# Patient Record
Sex: FEMALE | Race: BLACK OR AFRICAN AMERICAN | Employment: OTHER | URBAN - METROPOLITAN AREA
[De-identification: names, ages, dates, MRNs, and addresses within clinical notes are randomized per-mention and may not be internally consistent; named-entity substitution may affect disease eponyms.]

---

## 2017-12-05 ENCOUNTER — OFFICE VISIT (OUTPATIENT)
Dept: CARDIOLOGY CLINIC | Age: 72
End: 2017-12-05

## 2017-12-05 VITALS
HEIGHT: 68 IN | DIASTOLIC BLOOD PRESSURE: 68 MMHG | BODY MASS INDEX: 42.74 KG/M2 | SYSTOLIC BLOOD PRESSURE: 147 MMHG | WEIGHT: 282 LBS | HEART RATE: 61 BPM

## 2017-12-05 DIAGNOSIS — I50.32 CHRONIC DIASTOLIC HEART FAILURE (HCC): Primary | ICD-10-CM

## 2017-12-05 DIAGNOSIS — R07.9 CHEST PAIN, UNSPECIFIED TYPE: ICD-10-CM

## 2017-12-05 DIAGNOSIS — I10 ESSENTIAL HYPERTENSION: ICD-10-CM

## 2017-12-05 DIAGNOSIS — E66.01 MORBID OBESITY (HCC): ICD-10-CM

## 2017-12-05 RX ORDER — METOPROLOL SUCCINATE 25 MG/1
25 TABLET, EXTENDED RELEASE ORAL DAILY
Qty: 30 TAB | Refills: 6 | Status: SHIPPED | OUTPATIENT
Start: 2017-12-05 | End: 2018-07-05

## 2017-12-05 NOTE — PROGRESS NOTES
HISTORY OF PRESENT ILLNESS  Otilia Holland is a 67 y.o. female. CHF   The history is provided by the patient. This is a chronic problem. The problem occurs constantly. The problem has not changed since onset. Pertinent negatives include no chest pain, no abdominal pain, no headaches and no shortness of breath. The symptoms are aggravated by exertion. The symptoms are relieved by rest.   Chest Pain (Angina)    The history is provided by the patient. This is a new problem. The current episode started more than 1 week ago. The problem has not changed since onset. The problem occurs every several days. The pain is associated with rest. The pain is present in the substernal region. The pain is mild. The quality of the pain is described as pressure-like. The pain does not radiate. Pertinent negatives include no abdominal pain, no claudication, no cough, no dizziness, no fever, no headaches, no hemoptysis, no nausea, no orthopnea, no palpitations, no PND, no shortness of breath, no sputum production, no vomiting and no weakness. Review of Systems   Constitutional: Negative for chills and fever. HENT: Negative for nosebleeds. Eyes: Negative for blurred vision and double vision. Respiratory: Negative for cough, hemoptysis, sputum production, shortness of breath and wheezing. Cardiovascular: Negative for chest pain, palpitations, orthopnea, claudication, leg swelling and PND. Gastrointestinal: Negative for abdominal pain, heartburn, nausea and vomiting. Musculoskeletal: Negative for myalgias. Skin: Negative for rash. Neurological: Negative for dizziness, weakness and headaches. Endo/Heme/Allergies: Does not bruise/bleed easily.      Family History   Problem Relation Age of Onset    Other Mother      congestive heart failure    Hypertension Mother     Stroke Mother     Hypertension Father     Heart Attack Father     Hypertension Brother        Past Medical History:   Diagnosis Date    Chronic diastolic CHF (congestive heart failure) (HCC)     Chronic diastolic heart failure (HCC)     Hx of colonic polyp     Hypothyroidism     Morbid obesity ([facility] [address] 7/9/2013    Transfusion history     No history of receiving blood or blood product transfusion(s).  Unspecified hypothyroidism        Past Surgical History:   Procedure Laterality Date    HX BUNIONECTOMY      left big toe    HX COLONOSCOPY  8/26/11    HX DILATION AND CURETTAGE  2011       Social History   Substance Use Topics    Smoking status: Never Smoker    Smokeless tobacco: Never Used    Alcohol use No      Comment: wine ocassionally lil wine       Allergies   Allergen Reactions    Ciprofloxacin-Dexamethasone Nausea Only       Prior to Admission medications    Medication Sig Start Date End Date Taking? Authorizing Provider   metoprolol succinate (TOPROL-XL) 25 mg XL tablet Take 1 Tab by mouth daily. 12/5/17  Yes Eugene Milligan MD   NAPROXEN SOD/DIPHENHYDRAM HCL (ALEVE PM PO) Take  by mouth as needed. Yes Historical Provider   hydrocortisone-pramoxine [facility]) 2.5-1 % rectal cream Apply  to affected area two (2) times a day. Patient taking differently: Apply  to affected area as needed. 12/30/13  Yes Delton Lesch, MD   levothyroxine (SYNTHROID) 100 mcg tablet Take  by mouth Daily (before breakfast). Yes Historical Provider         Visit Vitals    /68    Pulse 61    Ht 5' 8\" (1.727 m)    Wt 127.9 kg (282 lb)    BMI 42.88 kg/m2         Physical Exam   Constitutional: She is oriented to person, place, and time. She appears well-developed and well-nourished. HENT:   Head: Normocephalic and atraumatic. Eyes: Conjunctivae are normal.   Neck: Neck supple. No JVD present. No tracheal deviation present. No thyromegaly present. Cardiovascular: Normal rate and regular rhythm. PMI is not displaced. Exam reveals no gallop, no S3 and no decreased pulses. No murmur heard. Pulmonary/Chest: No respiratory distress.  She has no wheezes. She has no rales. She exhibits no tenderness. Abdominal: Soft. There is no tenderness. Musculoskeletal: She exhibits no edema. Neurological: She is alert and oriented to person, place, and time. Skin: Skin is warm. Psychiatric: She has a normal mood and affect. Ms. Talya Horn has a reminder for a \"due or due soon\" health maintenance. I have asked that she contact her primary care provider for follow-up on this health maintenance. CARDIOLOGY STUDIES 5/1/2012 1/2/2009   EKG Result WNL -   Echocardiogram - Complete Result - 60% EF, mild DD   Some recent data might be hidden         Assessment         ICD-10-CM ICD-9-CM    1. Chronic diastolic heart failure (HCC) I50.32 428.32 2D ECHO COMPLETE ADULT (TTE)      SCHEDULE NUCLEAR STUDY    stable  occ edema   2. Chest pain, unspecified type R07.9 786.50 2D ECHO COMPLETE ADULT (TTE)      SCHEDULE NUCLEAR STUDY    at rest  ? etiology  angina,chest wall   3. Morbid obesity (HCC) E66.01 278.01     disucssed  deit   4. Essential hypertension I10 401.9     mild add meds       There are no discontinued medications. Orders Placed This Encounter    SCHEDULE NUCLEAR STUDY     lexiscan stress test     Standing Status:   Future     Standing Expiration Date:   12/5/2018    2D ECHO COMPLETE ADULT (TTE)     Standing Status:   Future     Standing Expiration Date:   6/3/2018     Order Specific Question:   Reason for Exam:     Answer:   see diagnosis    metoprolol succinate (TOPROL-XL) 25 mg XL tablet     Sig: Take 1 Tab by mouth daily. Dispense:  30 Tab     Refill:  6       Follow-up Disposition:  Return for F/u after tests.

## 2017-12-05 NOTE — PROGRESS NOTES
1. Have you been to the ER, urgent care clinic since your last visit? Hospitalized since your last visit? No    2. Have you seen or consulted any other health care providers outside of the [address] [place] [address] since your last visit? Include any pap smears or colon screening. No     3. Since your last visit, have you had any of the following symptoms? .           4. Have you had any blood work, X-rays or cardiac testing? No             5.  Where do you normally have your labs drawn? [facility]    6. Do you need any refills today?    No

## 2017-12-05 NOTE — LETTER
Claudette Rhea 1945 
 
12/5/2017 Dear MD Kenia Wilkins MD 
 
I had the pleasure of evaluating  Ms. Alpesh Wing in office today. Below are the relevant portions of my assessment and plan of care. ICD-10-CM ICD-9-CM 1. Chronic diastolic heart failure (HCC) I50.32 428.32 2D ECHO COMPLETE ADULT (TTE) SCHEDULE NUCLEAR STUDY  
 stable 
occ edema 2. Chest pain, unspecified type R07.9 786.50 2D ECHO COMPLETE ADULT (TTE) SCHEDULE NUCLEAR STUDY  
 at rest 
? etiology 
angina,chest wall 3. Morbid obesity ([facility] [address] E66.01 278.01   
 disucssed  deit 4. Essential hypertension I10 401.9   
 mild add meds Current Outpatient Prescriptions Medication Sig Dispense Refill  metoprolol succinate (TOPROL-XL) 25 mg XL tablet Take 1 Tab by mouth daily. 30 Tab 6  
 NAPROXEN SOD/DIPHENHYDRAM HCL (ALEVE PM PO) Take  by mouth as needed.  hydrocortisone-pramoxine (ANALPRAM-HC) 2.5-1 % rectal cream Apply  to affected area two (2) times a day. (Patient taking differently: Apply  to affected area as needed.) 30 g 0  
 levothyroxine (SYNTHROID) 100 mcg tablet Take  by mouth Daily (before breakfast). Orders Placed This Encounter  SCHEDULE NUCLEAR STUDY  
  lexiscan stress test  
  Standing Status:   Future Standing Expiration Date:   12/5/2018  2D ECHO COMPLETE ADULT (TTE) Standing Status:   Future Standing Expiration Date:   6/3/2018 Order Specific Question:   Reason for Exam: Answer:   see diagnosis  metoprolol succinate (TOPROL-XL) 25 mg XL tablet Sig: Take 1 Tab by mouth daily. Dispense:  30 Tab Refill:  6 If you have questions, please do not hesitate to call me. I look forward to following Ms. Alpesh Wing along with you. Sincerely, Erik Leone MD

## 2017-12-05 NOTE — MR AVS SNAPSHOT
Visit Information Date & Time Provider Department Dept. Phone Encounter #  
 12/5/2017 11:30 AM Eduardo Zheng MD [facility] [place] [phone] [id number] Follow-up Instructions Return for F/u after tests. Upcoming Health Maintenance Date Due Hepatitis C Screening 1945 DTaP/Tdap/Td series (1 - Tdap) 5/3/1966 BREAST CANCER SCRN MAMMOGRAM 5/3/1995 ZOSTER VACCINE AGE 60> 3/3/2005 GLAUCOMA SCREENING Q2Y 5/3/2010 OSTEOPOROSIS SCREENING (DEXA) 5/3/2010 Pneumococcal 65+ Low/Medium Risk (1 of 2 - PCV13) 5/3/2010 MEDICARE YEARLY EXAM 5/3/2010 Influenza Age 5 to Adult 8/1/2017 COLONOSCOPY 8/26/2021 Allergies as of 12/5/2017  Review Complete On: 12/5/2017 By: Eduardo Zheng MD  
  
 Severity Noted Reaction Type Reactions Ciprofloxacin-dexamethasone Medium 10/25/2016    Nausea Only Current Immunizations  Never Reviewed No immunizations on file. Not reviewed this visit You Were Diagnosed With   
  
 Codes Comments Chronic diastolic heart failure (HCC)    -  Primary ICD-10-CM: I50.32 
ICD-9-CM: 428.32 stable 
occ edema Chest pain, unspecified type     ICD-10-CM: R07.9 ICD-9-CM: 786.50 at rest 
? etiology 
angina,chest wall [facility]     ICD-10-CM: E66.01 
ICD-9-CM: 278.01 disucssed  deit Essential hypertension     ICD-10-CM: I10 
ICD-9-CM: 401.9 mild add meds Vitals BP Pulse Height(growth percentile) Weight(growth percentile) BMI OB Status 147/68 61 5' 8\" (1.727 m) 282 lb (127.9 kg) 42.88 kg/m2 Postmenopausal  
 Smoking Status Never Smoker Vitals History BMI and BSA Data Body Mass Index Body Surface Area  
 42.88 kg/m 2 2.48 m 2 Preferred Pharmacy Pharmacy Name Phone [facility] [address], [address] Your Updated Medication List  
  
   
 This list is accurate as of: 12/5/17 11:35 AM.  Always use your most recent med list.  
  
  
  
  
 ALEVE PM PO Take  by mouth as needed. hydrocortisone-pramoxine 2.5-1 % rectal cream  
Commonly known as:  [facility] Apply  to affected area two (2) times a day. metoprolol succinate 25 mg XL tablet Commonly known as:  TOPROL-XL Take 1 Tab by mouth daily. SYNTHROID 100 mcg tablet Generic drug:  levothyroxine Take  by mouth Daily (before breakfast). Prescriptions Sent to Pharmacy Refills  
 metoprolol succinate (TOPROL-XL) 25 mg XL tablet 6 Sig: Take 1 Tab by mouth daily. Class: Normal  
 Pharmacy: [address] Ph #: [phone] Route: Oral  
  
Follow-up Instructions Return for F/u after tests. To-Do List   
 12/08/2017 Cardiac Services:  2D ECHO COMPLETE ADULT (TTE)   
  
 12/12/2017 Nursing:  SCHEDULE NUCLEAR STUDY Introducing [facility] & HEALTH SERVICES! Carmen Crouch introduces Education Everytime patient portal. Now you can access parts of your medical record, email your doctor's office, and request medication refills online. 1. In your internet browser, go to [email or web address] 2. Click on the First Time User? Click Here link in the Sign In box. You will see the New Member Sign Up page. 3. Enter your Education Everytime Access Code exactly as it appears below. You will not need to use this code after youve completed the sign-up process. If you do not sign up before the expiration date, you must request a new code. · Education Everytime Access Code: [email or web address] Expires: 3/5/2018 11:15 AM 
 
4. Enter the last four digits of your Social Security Number (xxxx) and Date of Birth (mm/dd/yyyy) as indicated and click Submit. You will be taken to the next sign-up page. 5. Create a Education Everytime ID. This will be your Education Everytime login ID and cannot be changed, so think of one that is secure and easy to remember. 6. Create a "[employer]" password. You can change your password at any time. 7. Enter your Password Reset Question and Answer. This can be used at a later time if you forget your password. 8. Enter your e-mail address. You will receive e-mail notification when new information is available in [address] 9. Click Sign Up. You can now view and download portions of your medical record. 10. Click the Download Summary menu link to download a portable copy of your medical information. If you have questions, please visit the Frequently Asked Questions section of the "[employer]" website. Remember, "[employer]" is NOT to be used for urgent needs. For medical emergencies, dial 911. Now available from your iPhone and Android! Please provide this summary of care documentation to your next provider. Your primary care clinician is listed as Duncan Leal. If you have any questions after today's visit, please call [phone].

## 2018-01-02 ENCOUNTER — CLINICAL SUPPORT (OUTPATIENT)
Dept: CARDIOLOGY CLINIC | Age: 73
End: 2018-01-02

## 2018-01-02 DIAGNOSIS — I10 ESSENTIAL HYPERTENSION, MALIGNANT: ICD-10-CM

## 2018-01-02 DIAGNOSIS — R07.9 CHEST PAIN, UNSPECIFIED TYPE: ICD-10-CM

## 2018-01-02 DIAGNOSIS — I50.32 CHRONIC DIASTOLIC HEART FAILURE (HCC): ICD-10-CM

## 2018-01-02 DIAGNOSIS — I50.32 CHRONIC DIASTOLIC HEART FAILURE (HCC): Primary | ICD-10-CM

## 2018-01-02 NOTE — PROGRESS NOTES
[facility]  [address], [facility] [employer], [address] [place] [address], [place], [address] [place] [address]  [phone] [employer]  [phone] [place]       Name: Janak Melgar         MRN#: [id number]        YOB: 1945   Gender: female Ht:5'8\" Wt:282 lbs       . Date of Rest/Stress Images: 1/2/2018   Referring Physician: Mathew Rodriguez MD  Ordering Physician: Cleveland Rao. Kylah Mitchell MD, [facility]  Technologist: Loren Livingston. GLADIS Corrigan., C.N.M.T  Diagnosis:  1. Chronic diastolic heart failure (HCC)    2. Chest pain, unspecified type    3. Essential hypertension, malignant          Rest/Stress Myoview SPECT Myocardial Perfusion Imaging with  Lexiscan Stress and gated SPECT Imaging      PROCEDURE:      Myocardial perfusion imaging was performed at rest approximately 30 mins following the intravenous injection,(Right hand ) of 11.8 mCi of Tc99m Myoview for evaluation of myocardial function and perfusion at rest.    Baseline Data:    Baseline EKG reveals PACs, poor R-wave progression. Baseline heart rate is 54. Baseline blood pressure is 138/84. Procedure: The patient was injected with 0.4 mg IV Lexiscan. The patient had no significant symptoms. Heart rate increased from baseline to a heart rate of 85. Blood pressure increased to 146/78. Electrocardiogram showed no significant ST-T changes during the procedure. No significant arrhythmias were noted. Diagnosis:   1. Negative EKG portion of Lexiscan stress test.    2. Nuclear imaging report to follow. Pharmacological:  Patient was injected with . 4 mg/mL with Lexiscan intravenously over a period 10 to 20 sec. After pharmacologic stress, the patient was injected intravenously with 38.8 mCi of Tc99m Myoview. Gating post stress tomographic imaging performed approximately 45 minutes post tracer injection.  The data was reconstructed in the short, horizontal long and vertical long axis views and tomographic slices were generated. NUCLEAR IMAGING:    Findings:   1. Stress images reveal normal Myoview distrubution in all the LV segments in short axis, vertical and horizontal long axis views. 2. Resting images have a normal uptake. 3. Gated images reveal normal wall motion and the ejection fraction is calculated to be 85%. Conclusion:   1. Normal perfusion scan. 2. Normal wall motion and ejection fraction. 3. No evidence of significant fixed or reversible defect suggesting ischemia or myocardial infarction noted from this nuclear study. 4. Low risk scan. Thank you for the referral.    E-signed and Interpreting Physician:    Gabriele Pickering.  Kendrick Yi MD, [facility]     Date of interpretation: 1/2/2018  Date of final report: 1/2/2018

## 2018-01-16 ENCOUNTER — OFFICE VISIT (OUTPATIENT)
Dept: CARDIOLOGY CLINIC | Age: 73
End: 2018-01-16

## 2018-01-16 VITALS
DIASTOLIC BLOOD PRESSURE: 74 MMHG | WEIGHT: 286 LBS | SYSTOLIC BLOOD PRESSURE: 142 MMHG | HEART RATE: 65 BPM | HEIGHT: 68 IN | BODY MASS INDEX: 43.35 KG/M2

## 2018-01-16 DIAGNOSIS — R07.9 CHEST PAIN, UNSPECIFIED TYPE: ICD-10-CM

## 2018-01-16 DIAGNOSIS — I50.32 CHRONIC DIASTOLIC HEART FAILURE (HCC): Primary | ICD-10-CM

## 2018-01-16 DIAGNOSIS — I34.0 NON-RHEUMATIC MITRAL REGURGITATION: ICD-10-CM

## 2018-01-16 NOTE — MR AVS SNAPSHOT
[facility] 
 
 
 [address] [facility] 
[phone] Patient: Nieves Boateng MRN: [id number] QPQ:[birth year] Visit Information Date & Time Provider Department Dept. Phone Encounter #  
 1/16/2018 10:00 AM Jerardo Herrera MD [facility] [place] [phone] [id number] Follow-up Instructions Return in about 6 months (around 7/16/2018). Upcoming Health Maintenance Date Due Hepatitis C Screening 1945 DTaP/Tdap/Td series (1 - Tdap) 5/3/1966 BREAST CANCER SCRN MAMMOGRAM 5/3/1995 ZOSTER VACCINE AGE 60> 3/3/2005 GLAUCOMA SCREENING Q2Y 5/3/2010 OSTEOPOROSIS SCREENING (DEXA) 5/3/2010 Pneumococcal 65+ Low/Medium Risk (1 of 2 - PCV13) 5/3/2010 MEDICARE YEARLY EXAM 5/3/2010 Influenza Age 5 to Adult 8/1/2017 COLONOSCOPY 8/26/2021 Allergies as of 1/16/2018  Review Complete On: 1/16/2018 By: Jerardo Herrera MD  
  
 Severity Noted Reaction Type Reactions Ciprofloxacin-dexamethasone Medium 10/25/2016    Nausea Only Current Immunizations  Never Reviewed No immunizations on file. Not reviewed this visit You Were Diagnosed With   
  
 Codes Comments Chronic diastolic heart failure (HCC)    -  Primary ICD-10-CM: I50.32 
ICD-9-CM: 428.32 stable 
normal lvef Non-rheumatic mitral regurgitation     ICD-10-CM: I34.0 ICD-9-CM: 424.0 better Chest pain, unspecified type     ICD-10-CM: R07.9 ICD-9-CM: 786.50 negative stress test  
  
Vitals BP Pulse Height(growth percentile) Weight(growth percentile) BMI OB Status 142/74 65 5' 8\" (1.727 m) 286 lb (129.7 kg) 43.49 kg/m2 Postmenopausal  
 Smoking Status Never Smoker Vitals History BMI and BSA Data Body Mass Index Body Surface Area  
 43.49 kg/m 2 2.49 m 2 Preferred Pharmacy Pharmacy Name Phone [address] [facility], [address] Your Updated Medication List  
  
   
This list is accurate as of: 1/16/18 10:22 AM.  Always use your most recent med list.  
  
  
  
  
 ALEVE PM PO Take  by mouth as needed. hydrocortisone-pramoxine 2.5-1 % rectal cream  
Commonly known as:  [facility] Apply  to affected area two (2) times a day. metoprolol succinate 25 mg XL tablet Commonly known as:  TOPROL-XL Take 1 Tab by mouth daily. SYNTHROID 100 mcg tablet Generic drug:  levothyroxine Take  by mouth Daily (before breakfast). Follow-up Instructions Return in about 6 months (around 7/16/2018). Introducing [facility] & HEALTH SERVICES! Angelica Brito introduces [employer] patient portal. Now you can access parts of your medical record, email your doctor's office, and request medication refills online. 1. In your internet browser, go to [email or web address] 2. Click on the First Time User? Click Here link in the Sign In box. You will see the New Member Sign Up page. 3. Enter your [employer] Access Code exactly as it appears below. You will not need to use this code after youve completed the sign-up process. If you do not sign up before the expiration date, you must request a new code. · [employer] Access Code: [email or web address] Expires: 3/5/2018 11:15 AM 
 
4. Enter the last four digits of your Social Security Number (xxxx) and Date of Birth (mm/dd/yyyy) as indicated and click Submit. You will be taken to the next sign-up page. 5. Create a [employer] ID. This will be your Payoneer login ID and cannot be changed, so think of one that is secure and easy to remember. 6. Create a [employer] password. You can change your password at any time. 7. Enter your Password Reset Question and Answer. This can be used at a later time if you forget your password. 8. Enter your e-mail address. You will receive e-mail notification when new information is available in [address] 9. Click Sign Up. You can now view and download portions of your medical record. 10. Click the Download Summary menu link to download a portable copy of your medical information. If you have questions, please visit the Frequently Asked Questions section of the [employer] website. Remember, [employer] is NOT to be used for urgent needs. For medical emergencies, dial 911. Now available from your iPhone and Android! Please provide this summary of care documentation to your next provider. Your primary care clinician is listed as Justin Chilel. If you have any questions after today's visit, please call [phone].

## 2018-01-16 NOTE — PROGRESS NOTES
HISTORY OF PRESENT ILLNESS  Otilia Coates is a 67 y.o. female. Hypertension   The history is provided by the patient. This is a chronic problem. The problem occurs constantly. The problem has not changed since onset. Pertinent negatives include no chest pain, no abdominal pain, no headaches and no shortness of breath. CHF   The history is provided by the patient. This is a chronic problem. The problem occurs constantly. The problem has not changed since onset. Pertinent negatives include no chest pain, no abdominal pain, no headaches and no shortness of breath. The symptoms are aggravated by exertion. The symptoms are relieved by rest.   Chest Pain (Angina)    The history is provided by the patient. This is a new problem. The current episode started more than 1 week ago. The problem has not changed since onset. The problem occurs every several days. The pain is associated with rest. The pain is present in the substernal region. The pain is mild. The quality of the pain is described as pressure-like. The pain does not radiate. Pertinent negatives include no abdominal pain, no claudication, no cough, no dizziness, no fever, no headaches, no hemoptysis, no nausea, no orthopnea, no palpitations, no PND, no shortness of breath, no sputum production, no vomiting and no weakness. Review of Systems   Constitutional: Negative for chills and fever. HENT: Negative for nosebleeds. Eyes: Negative for blurred vision and double vision. Respiratory: Negative for cough, hemoptysis, sputum production, shortness of breath and wheezing. Cardiovascular: Negative for chest pain, palpitations, orthopnea, claudication, leg swelling and PND. Gastrointestinal: Negative for abdominal pain, heartburn, nausea and vomiting. Musculoskeletal: Negative for myalgias. Skin: Negative for rash. Neurological: Negative for dizziness, weakness and headaches. Endo/Heme/Allergies: Does not bruise/bleed easily.      Family History Problem Relation Age of Onset    Other Mother      congestive heart failure    Hypertension Mother     Stroke Mother     Hypertension Father     Heart Attack Father     Hypertension Brother        Past Medical History:   Diagnosis Date    Chronic diastolic CHF (congestive heart failure) (HCC)     Chronic diastolic heart failure (HCC)     Hx of colonic polyp     Hypothyroidism     Morbid obesity ([facility] [address] 7/9/2013    Transfusion history     No history of receiving blood or blood product transfusion(s).  Unspecified hypothyroidism        Past Surgical History:   Procedure Laterality Date    HX BUNIONECTOMY      left big toe    HX COLONOSCOPY  8/26/11    HX DILATION AND CURETTAGE  2011       Social History   Substance Use Topics    Smoking status: Never Smoker    Smokeless tobacco: Never Used    Alcohol use No      Comment: wine ocassionally lil wine       Allergies   Allergen Reactions    Ciprofloxacin-Dexamethasone Nausea Only       Prior to Admission medications    Medication Sig Start Date End Date Taking? Authorizing Provider   NAPROXEN SOD/DIPHENHYDRAM HCL (ALEVE PM PO) Take  by mouth as needed. Yes Historical Provider   hydrocortisone-pramoxine [facility]) 2.5-1 % rectal cream Apply  to affected area two (2) times a day. Patient taking differently: Apply  to affected area as needed. 12/30/13  Yes Fer Zepeda MD   levothyroxine (SYNTHROID) 100 mcg tablet Take  by mouth Daily (before breakfast). Yes Historical Provider   metoprolol succinate (TOPROL-XL) 25 mg XL tablet Take 1 Tab by mouth daily. 12/5/17   Mary Farley MD         Visit Vitals    /74    Pulse 65    Ht 5' 8\" (1.727 m)    Wt 129.7 kg (286 lb)    BMI 43.49 kg/m2         Physical Exam   Constitutional: She is oriented to person, place, and time. She appears well-developed and well-nourished. HENT:   Head: Normocephalic and atraumatic. Eyes: Conjunctivae are normal.   Neck: Neck supple. No JVD present.  No tracheal deviation present. No thyromegaly present. Cardiovascular: Normal rate and regular rhythm. PMI is not displaced. Exam reveals no gallop, no S3 and no decreased pulses. No murmur heard. Pulmonary/Chest: No respiratory distress. She has no wheezes. She has no rales. She exhibits no tenderness. Abdominal: Soft. There is no tenderness. Musculoskeletal: She exhibits no edema. Neurological: She is alert and oriented to person, place, and time. Skin: Skin is warm. Psychiatric: She has a normal mood and affect. Ms. Rin Hobbs has a reminder for a \"due or due soon\" health maintenance. I have asked that she contact her primary care provider for follow-up on this health maintenance. CARDIOLOGY STUDIES 2012   EKG Result WNL -   Echocardiogram - Complete Result - 60% EF, mild DD   Some recent data might be hidden     SUMMARY:echo-2018  Left ventricle: Systolic function was normal. Ejection fraction was  estimated in the range of 55 % to 60 %. There were no regional wall motion  abnormalities. There was mild concentric hypertrophy. Doppler parameters  were consistent with abnormal left ventricular relaxation (grade 1  diastolic dysfunction). Right ventricle: The size was at the upper limits of normal.    Mitral valve: There was mild annular calcification. Tricuspid valve: There was mild regurgitation. Pulmonic valve: There was mild regurgitation. NUCLEAR IMAGIN2018     Findings:   1. Stress images reveal normal Myoview distrubution in all the LV segments in short axis, vertical and horizontal long axis views. 2. Resting images have a normal uptake. 3. Gated images reveal normal wall motion and the ejection fraction is calculated to be 85%. Conclusion:   1. Normal perfusion scan. 2. Normal wall motion and ejection fraction. 3. No evidence of significant fixed or reversible defect suggesting ischemia or myocardial infarction noted from this nuclear study. 4.  Low risk scan. Assessment         ICD-10-CM ICD-9-CM    1. Chronic diastolic heart failure (HCC) I50.32 428.32     stable  normal lvef   2. Non-rheumatic mitral regurgitation I34.0 424.0     better   3. Chest pain, unspecified type R07.9 786.50     negative stress test       There are no discontinued medications. No orders of the defined types were placed in this encounter. Follow-up Disposition:  Return in about 6 months (around 7/16/2018).

## 2018-01-16 NOTE — PROGRESS NOTES
1. Have you been to the ER, urgent care clinic since your last visit? Hospitalized since your last visit? No    2. Have you seen or consulted any other health care providers outside of the [address] [place] [address] since your last visit? Include any pap smears or colon screening. No     3. Since your last visit, have you had any of the following symptoms? .          4. Have you had any blood work, X-rays or cardiac testing? No          5. Where do you normally have your labs drawn? [facility]/PCP    6. Do you need any refills today?    No

## 2018-01-16 NOTE — LETTER
Joshua Wilfrid 1945 
 
1/16/2018 Dear MD Tr Moyer MD 
 
I had the pleasure of evaluating  Ms. Rudolph Buckner in office today. Below are the relevant portions of my assessment and plan of care. ICD-10-CM ICD-9-CM 1. Chronic diastolic heart failure (HCC) I50.32 428.32   
 stable 
normal lvef 2. Non-rheumatic mitral regurgitation I34.0 424.0   
 better 3. Chest pain, unspecified type R07.9 786.50   
 negative stress test  
 
 
Current Outpatient Prescriptions Medication Sig Dispense Refill  NAPROXEN SOD/DIPHENHYDRAM HCL (ALEVE PM PO) Take  by mouth as needed.  hydrocortisone-pramoxine (ANALPRAM-HC) 2.5-1 % rectal cream Apply  to affected area two (2) times a day. (Patient taking differently: Apply  to affected area as needed.) 30 g 0  
 levothyroxine (SYNTHROID) 100 mcg tablet Take  by mouth Daily (before breakfast).  metoprolol succinate (TOPROL-XL) 25 mg XL tablet Take 1 Tab by mouth daily. 30 Tab 6 No orders of the defined types were placed in this encounter. If you have questions, please do not hesitate to call me. I look forward to following Ms. Rudolph Buckner along with you. Sincerely, Dao Shanks MD

## 2018-07-05 ENCOUNTER — OFFICE VISIT (OUTPATIENT)
Dept: CARDIOLOGY CLINIC | Age: 73
End: 2018-07-05

## 2018-07-05 VITALS
BODY MASS INDEX: 42.89 KG/M2 | HEART RATE: 61 BPM | DIASTOLIC BLOOD PRESSURE: 74 MMHG | WEIGHT: 283 LBS | HEIGHT: 68 IN | SYSTOLIC BLOOD PRESSURE: 133 MMHG

## 2018-07-05 DIAGNOSIS — E03.9 ACQUIRED HYPOTHYROIDISM: ICD-10-CM

## 2018-07-05 DIAGNOSIS — I50.32 CHRONIC DIASTOLIC HEART FAILURE (HCC): Primary | ICD-10-CM

## 2018-07-05 DIAGNOSIS — E66.01 MORBID OBESITY (HCC): ICD-10-CM

## 2018-07-05 NOTE — PROGRESS NOTES
1. Have you been to the ER, urgent care clinic since your last visit? Hospitalized since your last visit? No    2. Have you seen or consulted any other health care providers outside of the [address] [place] [address] since your last visit? Include any pap smears or colon screening. No     3. Since your last visit, have you had any of the following symptoms? .           4. Have you had any blood work, X-rays or cardiac testing? 5. Where do you normally have your labs drawn? 6. Do you need any refills today?

## 2018-07-05 NOTE — MR AVS SNAPSHOT
[address] [facility] 
 
 
 [address] [facility] 
[phone] Patient: Sukhi Robert MRN: [id number] EJC:[birth year] Visit Information Date & Time Provider Department Dept. Phone Encounter #  
 7/5/2018 10:00 AM Edwin Ray MD [facility] [phone] [id number] Follow-up Instructions Return in about 6 months (around 1/5/2019). Your Appointments 1/4/2019 10:15 AM  
ESTABLISHED PATIENT with Edwin Ray MD  
[facility] [place] [facility] ([address] [facility]) Appt Note: 6 month [address] [place] [address] [facility] [address]  
  
   
 [address] [place] [id number] Upcoming Health Maintenance Date Due Hepatitis C Screening 1945 DTaP/Tdap/Td series (1 - Tdap) 5/3/1966 BREAST CANCER SCRN MAMMOGRAM 5/3/1995 ZOSTER VACCINE AGE 60> 3/3/2005 GLAUCOMA SCREENING Q2Y 5/3/2010 Bone Densitometry (Dexa) Screening 5/3/2010 Pneumococcal 65+ Low/Medium Risk (1 of 2 - PCV13) 5/3/2010 MEDICARE YEARLY EXAM 3/14/2018 Influenza Age 5 to Adult 8/1/2018 COLONOSCOPY 8/26/2021 Allergies as of 7/5/2018  Review Complete On: 7/5/2018 By: Edwin Ray MD  
  
 Severity Noted Reaction Type Reactions Ciprofloxacin-dexamethasone Medium 10/25/2016    Nausea Only Current Immunizations  Never Reviewed No immunizations on file. Not reviewed this visit You Were Diagnosed With   
  
 Codes Comments Chronic diastolic heart failure (HCC)    -  Primary ICD-10-CM: I50.32 
ICD-9-CM: 428.32 Clinically stable. Compensated [place] heart classification 2 Morbid obesity ([address]     ICD-10-CM: E66.01 
ICD-9-CM: 278.01 Diet and exercise. Acquired hypothyroidism     ICD-10-CM: E03.9 ICD-9-CM: 244.9 On Synthroid. Lab with PCP Vitals BP Pulse Height(growth percentile) Weight(growth percentile) BMI OB Status 133/74 61 5' 8\" (1.727 m) 283 lb (128.4 kg) 43.03 kg/m2 Postmenopausal  
 Smoking Status Never Smoker Vitals History BMI and BSA Data Body Mass Index Body Surface Area 43.03 kg/m 2 2.48 m 2 Preferred Pharmacy Pharmacy Name Phone [address], [address] Your Updated Medication List  
  
   
This list is accurate as of 7/5/18 10:16 AM.  Always use your most recent med list.  
  
  
  
  
 ALEVE PM PO Take  by mouth as needed. hydrocortisone-pramoxine 2.5-1 % rectal cream  
Commonly known as:  [facility] Apply  to affected area two (2) times a day. SYNTHROID 100 mcg tablet Generic drug:  levothyroxine Take  by mouth Daily (before breakfast). Follow-up Instructions Return in about 6 months (around 1/5/2019). Introducing [facility] [place] [facility]! [employer] introduces [employer] patient portal. Now you can access parts of your medical record, email your doctor's office, and request medication refills online. 1. In your internet browser, go to [email or web address] 2. Click on the First Time User? Click Here link in the Sign In box. You will see the New Member Sign Up page. 3. Enter your PLAXD Access Code exactly as it appears below. You will not need to use this code after youve completed the sign-up process. If you do not sign up before the expiration date, you must request a new code. · PLAXD Access Code: [email or web address] Expires: 10/3/2018  9:58 AM 
 
4. Enter the last four digits of your Social Security Number (xxxx) and Date of Birth (mm/dd/yyyy) as indicated and click Submit. You will be taken to the next sign-up page. 5. Create a PLAXD ID. This will be your PLAXD login ID and cannot be changed, so think of one that is secure and easy to remember. 6. Create a shopatplacest password. You can change your password at any time. 7. Enter your Password Reset Question and Answer. This can be used at a later time if you forget your password. 8. Enter your e-mail address. You will receive e-mail notification when new information is available in [address] 9. Click Sign Up. You can now view and download portions of your medical record. 10. Click the Download Summary menu link to download a portable copy of your medical information. If you have questions, please visit the Frequently Asked Questions section of the [employer] website. Remember, [employer] is NOT to be used for urgent needs. For medical emergencies, dial 911. Now available from your iPhone and Android! Please provide this summary of care documentation to your next provider. Your primary care clinician is listed as Karely Daniels. If you have any questions after today's visit, please call [phone].

## 2018-07-05 NOTE — PROGRESS NOTES
HISTORY OF PRESENT ILLNESS  Otilia Araiza is a 68 y.o. female. CHF   The history is provided by the patient. This is a chronic problem. The problem occurs constantly. The problem has not changed since onset. Pertinent negatives include no chest pain, no abdominal pain, no headaches and no shortness of breath. The symptoms are aggravated by exertion. The symptoms are relieved by rest.   Hypertension   The history is provided by the patient. This is a chronic problem. The problem occurs constantly. The problem has not changed since onset. Pertinent negatives include no chest pain, no abdominal pain, no headaches and no shortness of breath. Review of Systems   Constitutional: Negative for chills and fever. HENT: Negative for nosebleeds. Eyes: Negative for blurred vision and double vision. Respiratory: Negative for cough, hemoptysis, sputum production, shortness of breath and wheezing. Cardiovascular: Negative for chest pain, palpitations, orthopnea, claudication, leg swelling and PND. Gastrointestinal: Negative for abdominal pain, heartburn, nausea and vomiting. Musculoskeletal: Negative for myalgias. Skin: Negative for rash. Neurological: Negative for dizziness, weakness and headaches. Endo/Heme/Allergies: Does not bruise/bleed easily. Family History   Problem Relation Age of Onset    Other Mother      congestive heart failure    Hypertension Mother     Stroke Mother     Hypertension Father     Heart Attack Father     Hypertension Brother        Past Medical History:   Diagnosis Date    Chronic diastolic CHF (congestive heart failure) (HCC)     Chronic diastolic heart failure (HCC)     Hx of colonic polyp     Hypothyroidism     Morbid obesity ([facility] [address] 7/9/2013    Transfusion history     No history of receiving blood or blood product transfusion(s).     Unspecified hypothyroidism        Past Surgical History:   Procedure Laterality Date    HX BUNIONECTOMY      left big toe    HX COLONOSCOPY  8/26/11    HX DILATION AND CURETTAGE  2011       Social History   Substance Use Topics    Smoking status: Never Smoker    Smokeless tobacco: Never Used    Alcohol use No      Comment: wine ocassionally lil wine       Allergies   Allergen Reactions    Ciprofloxacin-Dexamethasone Nausea Only       Prior to Admission medications    Medication Sig Start Date End Date Taking? Authorizing Provider   NAPROXEN SOD/DIPHENHYDRAM HCL (ALEVE PM PO) Take  by mouth as needed. Yes Historical Provider   hydrocortisone-pramoxine [facility]) 2.5-1 % rectal cream Apply  to affected area two (2) times a day. Patient taking differently: Apply  to affected area as needed. 12/30/13  Yes Leopoldo Abts, MD   levothyroxine (SYNTHROID) 100 mcg tablet Take  by mouth Daily (before breakfast). Yes Historical Provider         Visit Vitals    /74    Pulse 61    Ht 5' 8\" (1.727 m)    Wt 128.4 kg (283 lb)    BMI 43.03 kg/m2         Physical Exam   Constitutional: She is oriented to person, place, and time. She appears well-developed and well-nourished. HENT:   Head: Normocephalic and atraumatic. Eyes: Conjunctivae are normal.   Neck: Neck supple. No JVD present. No tracheal deviation present. No thyromegaly present. Cardiovascular: Normal rate and regular rhythm. PMI is not displaced. Exam reveals no gallop, no S3 and no decreased pulses. No murmur heard. Pulmonary/Chest: No respiratory distress. She has no wheezes. She has no rales. She exhibits no tenderness. Abdominal: Soft. There is no tenderness. Musculoskeletal: She exhibits no edema. Neurological: She is alert and oriented to person, place, and time. Skin: Skin is warm. Psychiatric: She has a normal mood and affect. Ms. Arron Vidal has a reminder for a \"due or due soon\" health maintenance. I have asked that she contact her primary care provider for follow-up on this health maintenance.     CARDIOLOGY STUDIES 5/1/2012 1/2/2009   EKG Result WNL -   Echocardiogram - Complete Result - 60% EF, mild DD   Some recent data might be hidden     SUMMARY:echo-2018  Left ventricle: Systolic function was normal. Ejection fraction was  estimated in the range of 55 % to 60 %. There were no regional wall motion  abnormalities. There was mild concentric hypertrophy. Doppler parameters  were consistent with abnormal left ventricular relaxation (grade 1  diastolic dysfunction). Right ventricle: The size was at the upper limits of normal.    Mitral valve: There was mild annular calcification. Tricuspid valve: There was mild regurgitation. Pulmonic valve: There was mild regurgitation. NUCLEAR IMAGIN2018     Findings:   1. Stress images reveal normal Myoview distrubution in all the LV segments in short axis, vertical and horizontal long axis views. 2. Resting images have a normal uptake. 3. Gated images reveal normal wall motion and the ejection fraction is calculated to be 85%. Conclusion:   1. Normal perfusion scan. 2. Normal wall motion and ejection fraction. 3. No evidence of significant fixed or reversible defect suggesting ischemia or myocardial infarction noted from this nuclear study. 4. Low risk scan. Assessment         ICD-10-CM ICD-9-CM    1. Chronic diastolic heart failure (HCC) I50.32 428.32     Clinically stable. Compensated [place] heart classification 2   2. Morbid obesity (HCC) E66.01 278.01     Diet and exercise. 3. Acquired hypothyroidism E03.9 244.9     On Synthroid. Lab with PCP     18  CHF compensated. Blood pressure is controlled. Not requiring metoprolol. Will continue to monitor with salt restriction and diet    Medications Discontinued During This Encounter   Medication Reason    metoprolol succinate (TOPROL-XL) 25 mg XL tablet Not A Current Medication       No orders of the defined types were placed in this encounter. Follow-up Disposition:  Return in about 6 months (around 2019).

## 2018-07-05 NOTE — LETTER
Cherrychris Devi 1945 
 
7/5/2018 Dear MD Kristen Myers MD 
 
I had the pleasure of evaluating  Ms. Dayanara Agrawal in office today. Below are the relevant portions of my assessment and plan of care. ICD-10-CM ICD-9-CM 1. Chronic diastolic heart failure (HCC) I50.32 428.32 Clinically stable. Compensated [place] heart classification 2  
2. Morbid obesity ([address] E66.01 278.01 Diet and exercise. 3. Acquired hypothyroidism E03.9 244.9 On Synthroid. Lab with PCP Current Outpatient Prescriptions Medication Sig Dispense Refill  NAPROXEN SOD/DIPHENHYDRAM HCL (ALEVE PM PO) Take  by mouth as needed.  hydrocortisone-pramoxine (ANALPRAM-HC) 2.5-1 % rectal cream Apply  to affected area two (2) times a day. (Patient taking differently: Apply  to affected area as needed.) 30 g 0  
 levothyroxine (SYNTHROID) 100 mcg tablet Take  by mouth Daily (before breakfast). No orders of the defined types were placed in this encounter. If you have questions, please do not hesitate to call me. I look forward to following Ms. Dayanara Agrawal along with you. Sincerely, Alona Starkey MD

## 2019-01-04 ENCOUNTER — OFFICE VISIT (OUTPATIENT)
Dept: CARDIOLOGY CLINIC | Age: 74
End: 2019-01-04

## 2019-01-04 VITALS
HEART RATE: 62 BPM | HEIGHT: 68 IN | BODY MASS INDEX: 42.44 KG/M2 | WEIGHT: 280 LBS | SYSTOLIC BLOOD PRESSURE: 133 MMHG | DIASTOLIC BLOOD PRESSURE: 69 MMHG

## 2019-01-04 DIAGNOSIS — E03.9 ACQUIRED HYPOTHYROIDISM: ICD-10-CM

## 2019-01-04 DIAGNOSIS — I34.0 NON-RHEUMATIC MITRAL REGURGITATION: ICD-10-CM

## 2019-01-04 DIAGNOSIS — I10 ESSENTIAL HYPERTENSION: ICD-10-CM

## 2019-01-04 DIAGNOSIS — I50.32 CHRONIC DIASTOLIC HEART FAILURE (HCC): Primary | ICD-10-CM

## 2019-01-04 DIAGNOSIS — E66.01 MORBID OBESITY (HCC): ICD-10-CM

## 2019-01-04 RX ORDER — ASPIRIN 81 MG/1
81 TABLET ORAL DAILY
Qty: 100 TAB | Refills: 1
Start: 2019-01-04

## 2019-01-04 NOTE — PROGRESS NOTES
1. Have you been to the ER, urgent care clinic since your last visit? Hospitalized since your last visit? 
 
 no 
2. Have you seen or consulted any other health care providers outside of the [address] [place] [address] since your last visit? Include any pap smears or colon screening. No  
 
3. Since your last visit, have you had any of the following symptoms?  
 
 swelling in legs/arms.

## 2019-01-04 NOTE — PATIENT INSTRUCTIONS
DASH Diet: Care Instructions Your Care Instructions The DASH diet is an eating plan that can help lower your blood pressure. DASH stands for Dietary Approaches to Stop Hypertension. Hypertension is high blood pressure. The DASH diet focuses on eating foods that are high in calcium, potassium, and magnesium. These nutrients can lower blood pressure. The foods that are highest in these nutrients are fruits, vegetables, low-fat dairy products, nuts, seeds, and legumes. But taking calcium, potassium, and magnesium supplements instead of eating foods that are high in those nutrients does not have the same effect. The DASH diet also includes whole grains, fish, and poultry. The DASH diet is one of several lifestyle changes your doctor may recommend to lower your high blood pressure. Your doctor may also want you to decrease the amount of sodium in your diet. Lowering sodium while following the DASH diet can lower blood pressure even further than just the DASH diet alone. Follow-up care is a key part of your treatment and safety. Be sure to make and go to all appointments, and call your doctor if you are having problems. It's also a good idea to know your test results and keep a list of the medicines you take. How can you care for yourself at home? Following the DASH diet · Eat 4 to 5 servings of fruit each day. A serving is 1 medium-sized piece of fruit, ½ cup chopped or canned fruit, 1/4 cup dried fruit, or 4 ounces (½ cup) of fruit juice. Choose fruit more often than fruit juice. · Eat 4 to 5 servings of vegetables each day. A serving is 1 cup of lettuce or raw leafy vegetables, ½ cup of chopped or cooked vegetables, or 4 ounces (½ cup) of vegetable juice. Choose vegetables more often than vegetable juice. · Get 2 to 3 servings of low-fat and fat-free dairy each day. A serving is 8 ounces of milk, 1 cup of yogurt, or 1 ½ ounces of cheese. · Eat 6 to 8 servings of grains each day. A serving is 1 slice of bread, 1 ounce of dry cereal, or ½ cup of cooked rice, pasta, or cooked cereal. Try to choose whole-grain products as much as possible. · Limit lean meat, poultry, and fish to 2 servings each day. A serving is 3 ounces, about the size of a deck of cards. · Eat 4 to 5 servings of nuts, seeds, and legumes (cooked dried beans, lentils, and split peas) each week. A serving is 1/3 cup of nuts, 2 tablespoons of seeds, or ½ cup of cooked beans or peas. · Limit fats and oils to 2 to 3 servings each day. A serving is 1 teaspoon of vegetable oil or 2 tablespoons of salad dressing. · Limit sweets and added sugars to 5 servings or less a week. A serving is 1 tablespoon jelly or jam, ½ cup sorbet, or 1 cup of lemonade. · Eat less than 2,300 milligrams (mg) of sodium a day. If you limit your sodium to 1,500 mg a day, you can lower your blood pressure even more. Tips for success · Start small. Do not try to make dramatic changes to your diet all at once. You might feel that you are missing out on your favorite foods and then be more likely to not follow the plan. Make small changes, and stick with them. Once those changes become habit, add a few more changes. · Try some of the following: ? Make it a goal to eat a fruit or vegetable at every meal and at snacks. This will make it easy to get the recommended amount of fruits and vegetables each day. ? Try yogurt topped with fruit and nuts for a snack or healthy dessert. ? Add lettuce, tomato, cucumber, and onion to sandwiches. ? Combine a ready-made pizza crust with low-fat mozzarella cheese and lots of vegetable toppings. Try using tomatoes, squash, spinach, broccoli, carrots, cauliflower, and onions. ? Have a variety of cut-up vegetables with a low-fat dip as an appetizer instead of chips and dip. ? Sprinkle sunflower seeds or chopped almonds over salads.  Or try adding chopped walnuts or almonds to cooked vegetables. ? Try some vegetarian meals using beans and peas. Add garbanzo or kidney beans to salads. Make burritos and tacos with mashed curran beans or black beans. Where can you learn more? Go to http://negrita-kaylee.info/. Enter G817 in the search box to learn more about \"DASH Diet: Care Instructions. \" Current as of: December 6, 2017 Content Version: 11.8 © 1518-3523 [employer]. Care instructions adapted under license by [employer] (which disclaims liability or warranty for this information). If you have questions about a medical condition or this instruction, always ask your healthcare professional. [address] any warranty or liability for your use of this information.

## 2019-01-04 NOTE — PROGRESS NOTES
HISTORY OF PRESENT ILLNESS Mark Vasquez is a 68 y.o. female. CHF The history is provided by the patient. This is a chronic problem. The problem occurs constantly. The problem has not changed since onset. Pertinent negatives include no chest pain, no abdominal pain, no headaches and no shortness of breath. The symptoms are aggravated by exertion. The symptoms are relieved by rest.  
Hypertension The history is provided by the patient. This is a chronic problem. The problem occurs constantly. The problem has not changed since onset. Pertinent negatives include no chest pain, no abdominal pain, no headaches and no shortness of breath. Review of Systems Constitutional: Negative for chills and fever. HENT: Negative for nosebleeds. Eyes: Negative for blurred vision and double vision. Respiratory: Negative for cough, hemoptysis, sputum production, shortness of breath and wheezing. Cardiovascular: Negative for chest pain, palpitations, orthopnea, claudication, leg swelling and PND. Gastrointestinal: Negative for abdominal pain, heartburn, nausea and vomiting. Musculoskeletal: Negative for myalgias. Skin: Negative for rash. Neurological: Negative for dizziness, weakness and headaches. Endo/Heme/Allergies: Does not bruise/bleed easily. Family History Problem Relation Age of Onset  Other Mother   
     congestive heart failure  Hypertension Mother  Stroke Mother  Hypertension Father  Heart Attack Father  Hypertension Brother Past Medical History:  
Diagnosis Date  Chronic diastolic CHF (congestive heart failure) ([address]  Chronic diastolic heart failure ([address]  Hx of colonic polyp  Hypothyroidism  Morbid obesity ([address] 7/9/2013  Transfusion history No history of receiving blood or blood product transfusion(s).  Unspecified hypothyroidism Past Surgical History:  
Procedure Laterality Date  HX BUNIONECTOMY    
 left big toe  HX COLONOSCOPY  8/26/11  
 HX DILATION AND CURETTAGE  2011 Social History Tobacco Use  Smoking status: Never Smoker  Smokeless tobacco: Never Used Substance Use Topics  Alcohol use: No  
  Comment: wine ocassionally lil wine Allergies Allergen Reactions  Ciprofloxacin-Dexamethasone Nausea Only Prior to Admission medications Medication Sig Start Date End Date Taking? Authorizing Provider  
aspirin delayed-release 81 mg tablet Take 1 Tab by mouth daily. 1/4/19  Yes Hema Kaplan MD  
NAPROXEN SOD/DIPHENHYDRAM HCL (ALEVE PM PO) Take  by mouth as needed. Yes Provider, Historical  
hydrocortisone-pramoxine (ANALPRAM-HC) 2.5-1 % rectal cream Apply  to affected area two (2) times a day. Patient taking differently: Apply  to affected area as needed. 12/30/13  Yes Sulma Campbell MD  
levothyroxine (SYNTHROID) 100 mcg tablet Take  by mouth Daily (before breakfast). Yes Provider, Historical  
 
 
 
Visit Vitals /69 Pulse 62 Ht 5' 8\" (1.727 m) Wt 127 kg (280 lb) BMI 42.57 kg/m² Physical Exam  
Constitutional: She is oriented to person, place, and time. She appears well-developed and well-nourished. HENT:  
Head: Normocephalic and atraumatic. Eyes: Conjunctivae are normal.  
Neck: Neck supple. No JVD present. No tracheal deviation present. No thyromegaly present. Cardiovascular: Normal rate and regular rhythm. PMI is not displaced. Exam reveals no gallop, no S3 and no decreased pulses. No murmur heard. Pulmonary/Chest: No respiratory distress. She has no wheezes. She has no rales. She exhibits no tenderness. Abdominal: Soft. There is no tenderness. Musculoskeletal: She exhibits no edema. Neurological: She is alert and oriented to person, place, and time. Skin: Skin is warm. Psychiatric: She has a normal mood and affect. Ms. Chang Liz has a reminder for a \"due or due soon\" health maintenance.  I have asked that she contact her primary care provider for follow-up on this health maintenance. No flowsheet data found. SUMMARY:echo-2018 Left ventricle: Systolic function was normal. Ejection fraction was 
estimated in the range of 55 % to 60 %. There were no regional wall motion 
abnormalities. There was mild concentric hypertrophy. Doppler parameters 
were consistent with abnormal left ventricular relaxation (grade 1 
diastolic dysfunction). Right ventricle: The size was at the upper limits of normal. 
 
Mitral valve: There was mild annular calcification. Tricuspid valve: There was mild regurgitation. Pulmonic valve: There was mild regurgitation. NUCLEAR IMAGIN2018 Findings: 1. Stress images reveal normal Myoview distrubution in all the LV segments in short axis, vertical and horizontal long axis views. 2. Resting images have a normal uptake. 3. Gated images reveal normal wall motion and the ejection fraction is calculated to be 85%. Conclusion: 1. Normal perfusion scan. 2. Normal wall motion and ejection fraction. 3. No evidence of significant fixed or reversible defect suggesting ischemia or myocardial infarction noted from this nuclear study. 4. Low risk scan. Assessment ICD-10-CM ICD-9-CM 1. Chronic diastolic heart failure (HCC) I50.32 428.32   
 stable 
class2 2. Acquired hypothyroidism E03.9 244.9   
 stable 
lab with pcp 3. Non-rheumatic mitral regurgitation I34.0 424.0   
 stable 4. Morbid obesity ([address] E66.01 278.01   
 diet and exercise 5. Essential hypertension I10 401.9   
 stable 
diet controlled 2018 CHF compensated. Blood pressure is controlled. Not requiring metoprolol. Will continue to monitor with salt restriction and diet 2019 Stable cardiac status. Blood pressure is controlled on diet alone. Continue aspirin. Check lipids with PCP There are no discontinued medications. Orders Placed This Encounter  aspirin delayed-release 81 mg tablet Sig: Take 1 Tab by mouth daily. Dispense:  100 Tab Refill:  1 Follow-up Disposition: 
Return in about 6 months (around 7/4/2019).

## 2019-07-09 ENCOUNTER — OFFICE VISIT (OUTPATIENT)
Dept: CARDIOLOGY CLINIC | Age: 74
End: 2019-07-09

## 2019-07-09 VITALS
WEIGHT: 290 LBS | DIASTOLIC BLOOD PRESSURE: 72 MMHG | SYSTOLIC BLOOD PRESSURE: 143 MMHG | BODY MASS INDEX: 43.95 KG/M2 | HEIGHT: 68 IN | HEART RATE: 72 BPM

## 2019-07-09 DIAGNOSIS — I10 ESSENTIAL HYPERTENSION: ICD-10-CM

## 2019-07-09 DIAGNOSIS — E03.9 ACQUIRED HYPOTHYROIDISM: ICD-10-CM

## 2019-07-09 DIAGNOSIS — E66.01 MORBID OBESITY (HCC): ICD-10-CM

## 2019-07-09 DIAGNOSIS — I50.32 CHRONIC DIASTOLIC HEART FAILURE (HCC): Primary | ICD-10-CM

## 2019-07-09 DIAGNOSIS — R29.898 WEAKNESS OF BOTH LEGS: ICD-10-CM

## 2019-07-09 NOTE — PROGRESS NOTES
HISTORY OF PRESENT ILLNESS  Otilia Ash is a 76 y.o. female. 7/2019  Denies any chest pain tightness pressure. Has complaint of weakness in the leg when she stands up and feels numb. Previous MRI from 1/2018 noted. Advised to follow-up with spine doctor again. CHF   The history is provided by the patient. This is a chronic problem. The problem occurs constantly. The problem has not changed since onset. Pertinent negatives include no chest pain, no abdominal pain, no headaches and no shortness of breath. The symptoms are aggravated by exertion. The symptoms are relieved by rest.   Hypertension   The history is provided by the patient. This is a chronic problem. The problem occurs constantly. The problem has not changed since onset. Pertinent negatives include no chest pain, no abdominal pain, no headaches and no shortness of breath. Review of Systems   Constitutional: Negative for chills and fever. HENT: Negative for nosebleeds. Eyes: Negative for blurred vision and double vision. Respiratory: Negative for cough, hemoptysis, sputum production, shortness of breath and wheezing. Cardiovascular: Negative for chest pain, palpitations, orthopnea, claudication, leg swelling and PND. Gastrointestinal: Negative for abdominal pain, heartburn, nausea and vomiting. Musculoskeletal: Negative for myalgias. Skin: Negative for rash. Neurological: Negative for dizziness, weakness and headaches. Endo/Heme/Allergies: Does not bruise/bleed easily.      Family History   Problem Relation Age of Onset    Other Mother         congestive heart failure    Hypertension Mother     Stroke Mother     Hypertension Father     Heart Attack Father     Hypertension Brother        Past Medical History:   Diagnosis Date    Chronic diastolic CHF (congestive heart failure) (HCC)     Chronic diastolic heart failure (HCC)     Hx of colonic polyp     Hypothyroidism     Morbid [facility] ([facility] [address] 7/9/2013    Transfusion history     No history of receiving blood or blood product transfusion(s).  Unspecified hypothyroidism        Past Surgical History:   Procedure Laterality Date    HX BUNIONECTOMY      left big toe    HX COLONOSCOPY  8/26/11    HX DILATION AND CURETTAGE  2011       Social History     Tobacco Use    Smoking status: Never Smoker    Smokeless tobacco: Never Used   Substance Use Topics    Alcohol use: No     Comment: wine ocassionally lil wine       Allergies   Allergen Reactions    Ciprofloxacin-Dexamethasone Nausea Only       Prior to Admission medications    Medication Sig Start Date End Date Taking? Authorizing Provider   aspirin delayed-release 81 mg tablet Take 1 Tab by mouth daily. 1/4/19  Yes Makayla Salazar MD   NAPROXEN SOD/DIPHENHYDRAM HCL (ALEVE PM PO) Take  by mouth as needed. Yes Provider, Historical   hydrocortisone-pramoxine (ANALPRAM-HC) 2.5-1 % rectal cream Apply  to affected area two (2) times a day. Patient taking differently: Apply  to affected area as needed. 12/30/13  Yes Kunal Campbell MD   levothyroxine (SYNTHROID) 100 mcg tablet Take  by mouth Daily (before breakfast). Yes Provider, Historical         Visit Vitals  /72   Pulse 72   Ht 5' 8\" (1.727 m)   Wt 131.5 kg (290 lb)   BMI 44.09 kg/m²         Physical Exam   Constitutional: She is oriented to person, place, and time. She appears well-developed and well-nourished. HENT:   Head: Normocephalic and atraumatic. Eyes: Conjunctivae are normal.   Neck: Neck supple. No JVD present. No tracheal deviation present. No thyromegaly present. Cardiovascular: Normal rate and regular rhythm. PMI is not displaced. Exam reveals no gallop, no S3 and no decreased pulses. No murmur heard. Pulmonary/Chest: No respiratory distress. She has no wheezes. She has no rales. She exhibits no tenderness. Abdominal: Soft. There is no tenderness. Musculoskeletal: She exhibits no edema.    Neurological: She is alert and oriented to person, place, and time. Skin: Skin is warm. Psychiatric: She has a normal mood and affect. Ms. Claudia Salazar has a reminder for a \"due or due soon\" health maintenance. I have asked that she contact her primary care provider for follow-up on this health maintenance. No flowsheet data found. SUMMARY:echo-2018  Left ventricle: Systolic function was normal. Ejection fraction was  estimated in the range of 55 % to 60 %. There were no regional wall motion  abnormalities. There was mild concentric hypertrophy. Doppler parameters  were consistent with abnormal left ventricular relaxation (grade 1  diastolic dysfunction). Right ventricle: The size was at the upper limits of normal.    Mitral valve: There was mild annular calcification. Tricuspid valve: There was mild regurgitation. Pulmonic valve: There was mild regurgitation. NUCLEAR IMAGIN2018     Findings:   1. Stress images reveal normal Myoview distrubution in all the LV segments in short axis, vertical and horizontal long axis views. 2. Resting images have a normal uptake. 3. Gated images reveal normal wall motion and the ejection fraction is calculated to be 85%. Conclusion:   1. Normal perfusion scan. 2. Normal wall motion and ejection fraction. 3. No evidence of significant fixed or reversible defect suggesting ischemia or myocardial infarction noted from this nuclear study. 4. Low risk scan. Assessment         ICD-10-CM ICD-9-CM    1. Chronic diastolic heart failure (HCC) I50.32 428.32     Stable class III short of breath multifactorial continue current treatment   2. Essential hypertension I10 401.9     Stable on treatment   3. Acquired hypothyroidism E03.9 244.9     Continue treatment. Lab with PCP   4. Morbid obesity ([address] E66.01 278.01     Continue with diet and exercise   5.  Weakness of both legs R29.898 729.89     MR LUMBAR SPINE W/O CONTRAST2018  [facility]  Result Impression  IMPRESSION:  1. Multilevel spondylosis as above. Moderate left foraminal stenosis    7/2018  CHF compensated. Blood pressure is controlled. Not requiring metoprolol. Will continue to monitor with salt restriction and diet  1/2019  Stable cardiac status. Blood pressure is controlled on diet alone. Continue aspirin. Check lipids with PCP    There are no discontinued medications. No orders of the defined types were placed in this encounter. Follow-up and Dispositions    · Return in about 6 months (around 1/9/2020).

## 2019-07-09 NOTE — PATIENT INSTRUCTIONS
[employer] Activation    Thank you for requesting access to [employer]. Please follow the instructions below to securely access and download your online medical record. [employer] allows you to send messages to your doctor, view your test results, renew your prescriptions, schedule appointments, and more. How Do I Sign Up? 1. In your internet browser, go to [email or web address]. 2. Click on the First Time User? Click Here link in the Sign In box. You will see the New Member Sign Up page. 3. Enter your [employer] Access Code exactly as it appears below. You will not need to use this code after youve completed the sign-up process. If you do not sign up before the expiration date, you must request a new code. [employer] Access Code: [email or web address]  Expires: 2019  8:55 AM (This is the date your [employer] access code will )    4. Enter the last four digits of your Social Security Number (xxxx) and Date of Birth (mm/dd/yyyy) as indicated and click Submit. You will be taken to the next sign-up page. 5. Create a [employer] ID. This will be your [employer] login ID and cannot be changed, so think of one that is secure and easy to remember. 6. Create a [employer] password. You can change your password at any time. 7. Enter your Password Reset Question and Answer. This can be used at a later time if you forget your password. 8. Enter your e-mail address. You will receive e-mail notification when new information is available in [address]. 9. Click Sign Up. You can now view and download portions of your medical record. 10. Click the Download Summary menu link to download a portable copy of your medical information. Additional Information    If you have questions, please visit the Frequently Asked Questions section of the [employer] website at [email or web address] Remember, [employer] is NOT to be used for urgent needs. For medical emergencies, dial 911.

## 2019-07-09 NOTE — PROGRESS NOTES
1. Have you been to the ER, urgent care clinic since your last visit? Hospitalized since your last visit? No    2. Have you seen or consulted any other health care providers outside of the [address] [place] [address] since your last visit? Include any pap smears or colon screening.  No

## 2019-12-02 ENCOUNTER — OFFICE VISIT (OUTPATIENT)
Dept: CARDIOLOGY CLINIC | Age: 74
End: 2019-12-02

## 2019-12-02 VITALS
BODY MASS INDEX: 43.5 KG/M2 | HEART RATE: 64 BPM | WEIGHT: 287 LBS | DIASTOLIC BLOOD PRESSURE: 74 MMHG | SYSTOLIC BLOOD PRESSURE: 163 MMHG | HEIGHT: 68 IN

## 2019-12-02 DIAGNOSIS — E03.9 ACQUIRED HYPOTHYROIDISM: ICD-10-CM

## 2019-12-02 DIAGNOSIS — R07.9 CHEST PAIN, UNSPECIFIED TYPE: ICD-10-CM

## 2019-12-02 DIAGNOSIS — I50.32 CHRONIC DIASTOLIC HEART FAILURE (HCC): Primary | ICD-10-CM

## 2019-12-02 DIAGNOSIS — I10 ESSENTIAL HYPERTENSION: ICD-10-CM

## 2019-12-02 RX ORDER — AMLODIPINE BESYLATE 5 MG/1
5 TABLET ORAL DAILY
Qty: 30 TAB | Refills: 5 | Status: SHIPPED | OUTPATIENT
Start: 2019-12-02 | End: 2020-01-03 | Stop reason: SDUPTHER

## 2019-12-02 NOTE — PATIENT INSTRUCTIONS
[employer] Activation Thank you for requesting access to [employer]. Please follow the instructions below to securely access and download your online medical record. [employer] allows you to send messages to your doctor, view your test results, renew your prescriptions, schedule appointments, and more. How Do I Sign Up? 1. In your internet browser, go to [email or web address]. 2. Click on the First Time User? Click Here link in the Sign In box. You will see the New Member Sign Up page. 3. Enter your [employer] Access Code exactly as it appears below. You will not need to use this code after youve completed the sign-up process. If you do not sign up before the expiration date, you must request a new code. [employer] Access Code: [email or web address] Expires: 2020  1:09 PM (This is the date your [employer] access code will ) 4. Enter the last four digits of your Social Security Number (xxxx) and Date of Birth (mm/dd/yyyy) as indicated and click Submit. You will be taken to the next sign-up page. 5. Create a [employer] ID. This will be your [employer] login ID and cannot be changed, so think of one that is secure and easy to remember. 6. Create a [employer] password. You can change your password at any time. 7. Enter your Password Reset Question and Answer. This can be used at a later time if you forget your password. 8. Enter your e-mail address. You will receive e-mail notification when new information is available in [address] 9. Click Sign Up. You can now view and download portions of your medical record. 10. Click the Download Summary menu link to download a portable copy of your medical information. Additional Information If you have questions, please visit the Frequently Asked Questions section of the [employer] website at [email or web address] Remember, [employer] is NOT to be used for urgent needs. For medical emergencies, dial 911.

## 2019-12-02 NOTE — PROGRESS NOTES
HISTORY OF PRESENT ILLNESS  Otilia Byrd is a 76 y.o. female. 7/2019  Denies any chest pain tightness pressure. Has complaint of weakness in the leg when she stands up and feels numb. Previous MRI from 1/2018 noted. Advised to follow-up with spine doctor again. 12/2019  Patient seen in office today with complaint of left-sided pain. Describes pain in her left shoulder while she is sleeping on the nighttime. Also on movement of her shoulder it feels worse. No other associated symptoms. No exertional pain. CHF   The history is provided by the patient. This is a chronic problem. The problem occurs constantly. The problem has not changed since onset. Pertinent negatives include no chest pain, no abdominal pain, no headaches and no shortness of breath. The symptoms are aggravated by exertion. The symptoms are relieved by rest.   Hypertension   The history is provided by the patient. This is a chronic problem. The problem occurs constantly. The problem has not changed since onset. Pertinent negatives include no chest pain, no abdominal pain, no headaches and no shortness of breath. Review of Systems   Constitutional: Negative for chills and fever. HENT: Negative for nosebleeds. Eyes: Negative for blurred vision and double vision. Respiratory: Negative for cough, hemoptysis, sputum production, shortness of breath and wheezing. Cardiovascular: Negative for chest pain, palpitations, orthopnea, claudication, leg swelling and PND. Gastrointestinal: Negative for abdominal pain, heartburn, nausea and vomiting. Musculoskeletal: Negative for myalgias. Skin: Negative for rash. Neurological: Negative for dizziness, weakness and headaches. Endo/Heme/Allergies: Does not bruise/bleed easily.      Family History   Problem Relation Age of Onset    Other Mother         congestive heart failure    Hypertension Mother     Stroke Mother     Hypertension Father     Heart Attack Father     Hypertension Brother        Past Medical History:   Diagnosis Date    Chronic diastolic CHF (congestive heart failure) (HCC)     Chronic diastolic heart failure (HCC)     Hx of colonic polyp     Hypothyroidism     Morbid obesity ([address] 7/9/2013    Transfusion history     No history of receiving blood or blood product transfusion(s).  Unspecified hypothyroidism        Past Surgical History:   Procedure Laterality Date    HX BUNIONECTOMY      left big toe    HX COLONOSCOPY  8/26/11    HX DILATION AND CURETTAGE  2011       Social History     Tobacco Use    Smoking status: Never Smoker    Smokeless tobacco: Never Used   Substance Use Topics    Alcohol use: No     Comment: wine ocassionally lil wine       Allergies   Allergen Reactions    Ciprofloxacin-Dexamethasone Nausea Only       Prior to Admission medications    Medication Sig Start Date End Date Taking? Authorizing Provider   amLODIPine (NORVASC) 5 mg tablet Take 1 Tab by mouth daily. 12/2/19  Yes Maricarmen Worthy MD   aspirin delayed-release 81 mg tablet Take 1 Tab by mouth daily. 1/4/19  Yes Maricarmen Worthy MD   NAPROXEN SOD/DIPHENHYDRAM HCL (ALEVE PM PO) Take  by mouth as needed. Yes Provider, Historical   hydrocortisone-pramoxine (ANALPRAM-HC) 2.5-1 % rectal cream Apply  to affected area two (2) times a day. Patient taking differently: Apply  to affected area as needed. 12/30/13  Yes Monty Campbell MD   levothyroxine (SYNTHROID) 100 mcg tablet Take 88 mcg by mouth Daily (before breakfast). Yes Provider, Historical         Visit Vitals  /74   Pulse 64   Ht 5' 8\" (1.727 m)   Wt 130.2 kg (287 lb)   BMI 43.64 kg/m²         Physical Exam   Constitutional: She is oriented to person, place, and time. She appears well-developed and well-nourished. HENT:   Head: Normocephalic and atraumatic. Eyes: Conjunctivae are normal.   Neck: Neck supple. No JVD present. No tracheal deviation present. No thyromegaly present.    Cardiovascular: Normal rate and regular rhythm. PMI is not displaced. Exam reveals no gallop, no S3 and no decreased pulses. No murmur heard. Pulmonary/Chest: No respiratory distress. She has no wheezes. She has no rales. She exhibits no tenderness. Abdominal: Soft. There is no tenderness. Musculoskeletal:         General: No edema. Neurological: She is alert and oriented to person, place, and time. Skin: Skin is warm. Psychiatric: She has a normal mood and affect. Ms. Turner Macario has a reminder for a \"due or due soon\" health maintenance. I have asked that she contact her primary care provider for follow-up on this health maintenance. No flowsheet data found. SUMMARY:echo-2018  Left ventricle: Systolic function was normal. Ejection fraction was  estimated in the range of 55 % to 60 %. There were no regional wall motion  abnormalities. There was mild concentric hypertrophy. Doppler parameters  were consistent with abnormal left ventricular relaxation (grade 1  diastolic dysfunction). Right ventricle: The size was at the upper limits of normal.    Mitral valve: There was mild annular calcification. Tricuspid valve: There was mild regurgitation. Pulmonic valve: There was mild regurgitation. NUCLEAR IMAGIN2018     Findings:   1. Stress images reveal normal Myoview distrubution in all the LV segments in short axis, vertical and horizontal long axis views. 2. Resting images have a normal uptake. 3. Gated images reveal normal wall motion and the ejection fraction is calculated to be 85%. Conclusion:   1. Normal perfusion scan. 2. Normal wall motion and ejection fraction. 3. No evidence of significant fixed or reversible defect suggesting ischemia or myocardial infarction noted from this nuclear study. 4. Low risk scan. Assessment         ICD-10-CM ICD-9-CM    1. Chronic diastolic heart failure (HCC) I50.32 428.32     Continue current monitoring. Compensated   2.  Essential hypertension I10 401.9     Remains mildly elevated I have added amlodipine 5 mg a day   3. Acquired hypothyroidism E03.9 244.9     Continue treatment monitor   4. Chest pain, unspecified type R07.9 786.50     Atypical.  Pain near left shoulder more on movement at rest pain possible bursitis or arthritis. Clinically noncardiac   7/2018  CHF compensated. Blood pressure is controlled. Not requiring metoprolol. Will continue to monitor with salt restriction and diet  1/2019  Stable cardiac status. Blood pressure is controlled on diet alone. Continue aspirin. Check lipids with PCP  12/2019  Noncardiac left-sided chest pain likely related to shoulder. Blood pressure elevated. Started on amlodipine 5 mg a day  There are no discontinued medications. Orders Placed This Encounter    amLODIPine (NORVASC) 5 mg tablet     Sig: Take 1 Tab by mouth daily. Dispense:  30 Tab     Refill:  5       Follow-up and Dispositions    · Return in about 6 months (around 6/2/2020).

## 2019-12-11 ENCOUNTER — OFFICE VISIT (OUTPATIENT)
Dept: ORTHOPEDIC SURGERY | Age: 74
End: 2019-12-11

## 2019-12-11 VITALS
HEART RATE: 57 BPM | WEIGHT: 284 LBS | RESPIRATION RATE: 16 BRPM | OXYGEN SATURATION: 100 % | DIASTOLIC BLOOD PRESSURE: 84 MMHG | BODY MASS INDEX: 43.04 KG/M2 | HEIGHT: 68 IN | SYSTOLIC BLOOD PRESSURE: 131 MMHG

## 2019-12-11 DIAGNOSIS — M25.562 CHRONIC PAIN OF BOTH KNEES: Primary | ICD-10-CM

## 2019-12-11 DIAGNOSIS — M25.561 CHRONIC PAIN OF BOTH KNEES: Primary | ICD-10-CM

## 2019-12-11 DIAGNOSIS — M17.12 PRIMARY OSTEOARTHRITIS OF LEFT KNEE: ICD-10-CM

## 2019-12-11 DIAGNOSIS — M17.11 PRIMARY OSTEOARTHRITIS OF RIGHT KNEE: ICD-10-CM

## 2019-12-11 DIAGNOSIS — G89.29 CHRONIC PAIN OF BOTH KNEES: Primary | ICD-10-CM

## 2019-12-11 RX ORDER — TRIAMCINOLONE ACETONIDE 40 MG/ML
40 INJECTION, SUSPENSION INTRA-ARTICULAR; INTRAMUSCULAR ONCE
Qty: 1 ML | Refills: 0
Start: 2019-12-11 | End: 2019-12-11

## 2019-12-11 NOTE — PROGRESS NOTES
Patient: Giorgi Caro                MRN: [id number]       SSN: [id number]  YOB: 1945        AGE: 76 y.o. SEX: female  Body mass index is 43.18 kg/m². PCP: Jose Bishop MD  12/11/19    Chief Complaint: Bilateral knee pain    HISTORY OF PRESENT ILLNESS:  Otilia is a 76year-old female who presents to the office today with bilateral knee pain. The left is worse than the right. She has had knee pain for several years. She notes stiffness, especially with sitting for long periods of time. She also notes deep pain in the posterior knee. She has had a shot in her knee years ago. None recently. She has also had a recent shot in her back done by Dr. Merna Jimenez, which did not help her symptoms down her leg. She is here today for an opinion on what to do. Past Medical History:   Diagnosis Date    Chronic diastolic CHF (congestive heart failure) (HCC)     Chronic diastolic heart failure (HCC)     Hx of colonic polyp     Hypothyroidism     Morbid obesity ([facility] [address] 7/9/2013    Transfusion history     No history of receiving blood or blood product transfusion(s).  Unspecified hypothyroidism        Family History   Problem Relation Age of Onset    Other Mother         congestive heart failure    Hypertension Mother     Stroke Mother     Hypertension Father     Heart Attack Father     Hypertension Brother        Current Outpatient Medications   Medication Sig Dispense Refill    amLODIPine (NORVASC) 5 mg tablet Take 1 Tab by mouth daily. 30 Tab 5    aspirin delayed-release 81 mg tablet Take 1 Tab by mouth daily. 100 Tab 1    NAPROXEN SOD/DIPHENHYDRAM HCL (ALEVE PM PO) Take  by mouth as needed.  hydrocortisone-pramoxine (ANALPRAM-HC) 2.5-1 % rectal cream Apply  to affected area two (2) times a day. (Patient taking differently: Apply  to affected area as needed.) 30 g 0    levothyroxine (SYNTHROID) 100 mcg tablet Take 88 mcg by mouth Daily (before breakfast). Allergies   Allergen Reactions    Ciprofloxacin Hives     Rash, nausea    Ciprofloxacin-Dexamethasone Nausea Only       Past Surgical History:   Procedure Laterality Date    HX BUNIONECTOMY      left big toe    HX COLONOSCOPY  8/26/11    HX DILATION AND CURETTAGE  2011       Social History     Socioeconomic History    Marital status:      Spouse name: Not on file    Number of children: Not on file    Years of education: Not on file    Highest education level: Not on file   Occupational History    Not on file   Social Needs    Financial resource strain: Not on file    Food insecurity:     Worry: Not on file     Inability: Not on file    Transportation needs:     Medical: Not on file     Non-medical: Not on file   Tobacco Use    Smoking status: Never Smoker    Smokeless tobacco: Never Used   Substance and Sexual Activity    Alcohol use: No     Comment: wine ocassionally lil wine    Drug use: No    Sexual activity: Not on file   Lifestyle    Physical activity:     Days per week: Not on file     Minutes per session: Not on file    Stress: Not on file   Relationships    Social connections:     Talks on phone: Not on file     Gets together: Not on file     Attends [employer] service: Not on file     Active member of club or organization: Not on file     Attends meetings of clubs or organizations: Not on file     Relationship status: Not on file    Intimate partner violence:     Fear of current or ex partner: Not on file     Emotionally abused: Not on file     Physically abused: Not on file     Forced sexual activity: Not on file   Other Topics Concern    Not on file   Social History Narrative    Not on file       REVIEW OF SYSTEMS:      CON: negative for recent weight loss/gain, fever, or chills  EYE: negative for double or blurry vision  ENT: negative for hoarseness  RS:   negative for cough, URI, SOB  CV:  negative for chest pain, palpitations  GI:    negative for blood in stool, nausea/vomiting  :  negative for blood in urine  MS: As per HPI  Other systems reviewed and noted below. PHYSICAL EXAMINATION:  Visit Vitals  /84   Pulse (!) 57   Resp 16   Ht 5' 8\" (1.727 m)   Wt 284 lb (128.8 kg)   SpO2 100%   BMI 43.18 kg/m²     Body mass index is 43.18 kg/m². GENERAL: Alert and oriented x3, in no acute distress, well-developed, well-nourished. HEENT: Normocephalic, atraumatic. RESP: Non labored breathing with equal chest rise on inspiration. CV: Well perfused extremities. No cyanosis or clubbing noted. ABDOMEN: Soft, non-tender, non-distended. PHYSICAL EXAM:  Physical exam of both knees with mild crepitus. No effusion, warmth or erythema. She is tender to palpation along the medial joint line bilaterally, on the lateral joint line on the left leg. No instability. She does have crepitus with knee range of motion. No significant limitations in her motion. She is neurovascularly intact distally. IMAGING:  X-rays of both knees were taken in the office today, which show bilateral osteoarthritis. ASSESSMENT AND PLAN:  Otilia is a 76year-old female with bilateral knee osteoarthritis. I had a lengthy discussion with her regarding treatment options today. We did discuss surgery, but she would like to hold off on that. We agreed on increasing her Meloxicam from 7.5 to 15 mg once a day. She will take two pills for that, as she has a 7.5 mg prescription. We will also try a left knee cortisone shot today to see how she responds to that. If she gets relief, she may come back for the right knee in the future. Follow up as needed.          [facility] - [facility]  OFFICE PROCEDURE PROGRESS NOTE        Chart reviewed for the following:   Toshia Prajapati MD, have reviewed the History, Physical and updated the Allergic reactions for Otilia [facility] performed immediately prior to start of procedure:   Toshia Prajapati MD, have performed the following reviews on Otilia Lima prior to the start of the procedure:            * Patient was identified by name and date of birth   * Agreement on procedure being performed was verified  * Risks and Benefits explained to the patient  * Procedure site verified and marked as necessary  * Patient was positioned for comfort  * Consent was signed and verified    Time: 0900      Date of procedure: 12/11/2019    Procedure performed by:  Celina Quigley MD    Provider assisted by: Sukhwinder Serrano LPN    Patient assisted by: self    How tolerated by patient: tolerated the procedure well with no complications    Post Procedural Pain Scale: 0 - No Hurt    Comments: none                Electronically signed by: Celina Quigley MD

## 2019-12-18 ENCOUNTER — OFFICE VISIT (OUTPATIENT)
Dept: ORTHOPEDIC SURGERY | Age: 74
End: 2019-12-18

## 2019-12-18 VITALS
DIASTOLIC BLOOD PRESSURE: 80 MMHG | TEMPERATURE: 97.6 F | OXYGEN SATURATION: 100 % | BODY MASS INDEX: 43.03 KG/M2 | SYSTOLIC BLOOD PRESSURE: 164 MMHG | WEIGHT: 283 LBS | HEART RATE: 59 BPM

## 2019-12-18 DIAGNOSIS — M25.561 CHRONIC PAIN OF BOTH KNEES: Primary | ICD-10-CM

## 2019-12-18 DIAGNOSIS — M17.11 PRIMARY OSTEOARTHRITIS OF RIGHT KNEE: ICD-10-CM

## 2019-12-18 DIAGNOSIS — G89.29 CHRONIC PAIN OF BOTH KNEES: Primary | ICD-10-CM

## 2019-12-18 DIAGNOSIS — M25.562 CHRONIC PAIN OF BOTH KNEES: Primary | ICD-10-CM

## 2019-12-18 RX ORDER — TRIAMCINOLONE ACETONIDE 40 MG/ML
40 INJECTION, SUSPENSION INTRA-ARTICULAR; INTRAMUSCULAR ONCE
Qty: 1 VIAL | Refills: 0
Start: 2019-12-18 | End: 2019-12-18

## 2019-12-18 RX ORDER — MELOXICAM 15 MG/1
15 TABLET ORAL DAILY
Qty: 90 TAB | Refills: 2 | Status: SHIPPED | OUTPATIENT
Start: 2019-12-18 | End: 2021-08-31

## 2019-12-18 NOTE — PROGRESS NOTES
Patient: Renae Sanchez                MRN: [id number]       SSN: [id number]  YOB: 1945        AGE: 76 y.o. SEX: female  Body mass index is 43.03 kg/m². PCP: Lily Francisco MD  12/18/19    Chief Complaint: Right knee pain    HISTORY OF PRESENT ILLNESS:  Otilia returns to the office today for her right knee. She was seen last week for her left knee. An injection was given in her left knee, which was helpful. Her right knee continues to have knee pain. She is also requesting a refill on Meloxicam today. Past Medical History:   Diagnosis Date    Chronic diastolic CHF (congestive heart failure) (HCC)     Chronic diastolic heart failure (HCC)     Hx of colonic polyp     Hypothyroidism     Morbid obesity ([address] 7/9/2013    Transfusion history     No history of receiving blood or blood product transfusion(s).  Unspecified hypothyroidism        Family History   Problem Relation Age of Onset    Other Mother         congestive heart failure    Hypertension Mother     Stroke Mother     Hypertension Father     Heart Attack Father     Hypertension Brother        Current Outpatient Medications   Medication Sig Dispense Refill    meloxicam (MOBIC) 15 mg tablet Take 1 Tab by mouth daily. 90 Tab 2    amLODIPine (NORVASC) 5 mg tablet Take 1 Tab by mouth daily. 30 Tab 5    aspirin delayed-release 81 mg tablet Take 1 Tab by mouth daily. 100 Tab 1    NAPROXEN SOD/DIPHENHYDRAM HCL (ALEVE PM PO) Take  by mouth as needed.  hydrocortisone-pramoxine (ANALPRAM-HC) 2.5-1 % rectal cream Apply  to affected area two (2) times a day. (Patient taking differently: Apply  to affected area as needed.) 30 g 0    levothyroxine (SYNTHROID) 100 mcg tablet Take 88 mcg by mouth Daily (before breakfast).          Allergies   Allergen Reactions    Ciprofloxacin Hives     Rash, nausea    Ciprofloxacin-Dexamethasone Nausea Only       Past Surgical History:   Procedure Laterality Date    HX BUNIONECTOMY      left big toe    HX COLONOSCOPY  8/26/11    HX DILATION AND CURETTAGE  2011       Social History     Socioeconomic History    Marital status:      Spouse name: Not on file    Number of children: Not on file    Years of education: Not on file    Highest education level: Not on file   Occupational History    Not on file   Social Needs    Financial resource strain: Not on file    Food insecurity:     Worry: Not on file     Inability: Not on file    Transportation needs:     Medical: Not on file     Non-medical: Not on file   Tobacco Use    Smoking status: Never Smoker    Smokeless tobacco: Never Used   Substance and Sexual Activity    Alcohol use: No     Comment: wine ocassionally lil wine    Drug use: No    Sexual activity: Not on file   Lifestyle    Physical activity:     Days per week: Not on file     Minutes per session: Not on file    Stress: Not on file   Relationships    Social connections:     Talks on phone: Not on file     Gets together: Not on file     Attends [employer] service: Not on file     Active member of club or organization: Not on file     Attends meetings of clubs or organizations: Not on file     Relationship status: Not on file    Intimate partner violence:     Fear of current or ex partner: Not on file     Emotionally abused: Not on file     Physically abused: Not on file     Forced sexual activity: Not on file   Other Topics Concern    Not on file   Social History Narrative    Not on file       REVIEW OF SYSTEMS:      No changes from previous review of systems unless noted. PHYSICAL EXAMINATION:  Visit Vitals  /80   Pulse (!) 59   Temp 97.6 °F (36.4 °C)   Wt 283 lb (128.4 kg)   SpO2 100%   BMI 43.03 kg/m²     Body mass index is 43.03 kg/m². GENERAL: Alert and oriented x3, in no acute distress. HEENT: Normocephalic, atraumatic. RESP: Non labored breathing. SKIN: No rashes or lesions noted.    PHYSICAL EXAM:  Physical exam of the right knee with mild crepitus with knee range of motion. Minimal tenderness to palpation over the medial and lateral joint line. No effusion, warmth or erythema. She is neurovascularly intact distally. ASSESSMENT AND PLAN:   Otilia has right knee arthritis that is symptomatic. She has responded well to an injection in the left knee in the past.  We discussed multiple treatment options. We will put her back on Meloxicam or refill her prescription for Meloxicam p.o. We will also try a cortisone shot in the right knee to see how she responds. Follow up as needed.        [facility] - [facility]  OFFICE PROCEDURE PROGRESS NOTE        Chart reviewed for the following:   Karmen Robins MD, have reviewed the History, Physical and updated the Allergic reactions for Otilia [address] [facility] [place] performed immediately prior to start of procedure:   Karmen Robins MD, have performed the following reviews on Otilia Talley prior to the start of the procedure:            * Patient was identified by name and date of birth   * Agreement on procedure being performed was verified  * Risks and Benefits explained to the patient  * Procedure site verified and marked as necessary  * Patient was positioned for comfort  * Consent was signed and verified    Time: [id number]      Date of procedure: 12/18/2019    Procedure performed by:  Nolan Byrd MD    Provider assisted by: Joanie Sharif LPN    Patient assisted by: self    How tolerated by patient: tolerated the procedure well with no complications    Post Procedural Pain Scale: 0 - No Hurt    Comments: none                  Electronically signed by: Nolan Byrd MD

## 2020-01-03 RX ORDER — AMLODIPINE BESYLATE 5 MG/1
5 TABLET ORAL DAILY
Qty: 90 TAB | Refills: 3 | Status: SHIPPED | OUTPATIENT
Start: 2020-01-03 | End: 2021-01-06

## 2020-06-16 ENCOUNTER — OFFICE VISIT (OUTPATIENT)
Dept: CARDIOLOGY CLINIC | Age: 75
End: 2020-06-16

## 2020-06-16 VITALS
DIASTOLIC BLOOD PRESSURE: 75 MMHG | SYSTOLIC BLOOD PRESSURE: 146 MMHG | BODY MASS INDEX: 43.35 KG/M2 | WEIGHT: 286 LBS | HEART RATE: 70 BPM | TEMPERATURE: 97 F | HEIGHT: 68 IN

## 2020-06-16 DIAGNOSIS — E03.9 ACQUIRED HYPOTHYROIDISM: ICD-10-CM

## 2020-06-16 DIAGNOSIS — I50.32 CHRONIC DIASTOLIC HEART FAILURE (HCC): ICD-10-CM

## 2020-06-16 DIAGNOSIS — E66.01 MORBID OBESITY (HCC): ICD-10-CM

## 2020-06-16 DIAGNOSIS — I10 ESSENTIAL HYPERTENSION: ICD-10-CM

## 2020-06-16 RX ORDER — HYDROCHLOROTHIAZIDE 12.5 MG/1
12.5 TABLET ORAL DAILY
Qty: 90 TAB | Refills: 1 | Status: SHIPPED | OUTPATIENT
Start: 2020-06-16 | End: 2021-01-05

## 2020-06-16 NOTE — PROGRESS NOTES
1. Have you been to the ER, urgent care clinic since your last visit? Hospitalized since your last visit? Yes [facility]  2. Have you seen or consulted any other health care providers outside of the [address] [place] [address] since your last visit? Include any pap smears or colon screening. Yes Where: pcp     3. Since your last visit, have you had any of the following symptoms?  no

## 2020-06-16 NOTE — PROGRESS NOTES
HISTORY OF PRESENT ILLNESS  Otilia Robert is a 76 y.o. female. 7/2019  Denies any chest pain tightness pressure. Has complaint of weakness in the leg when she stands up and feels numb. Previous MRI from 1/2018 noted. Advised to follow-up with spine doctor again. 12/2019  Patient seen in office today with complaint of left-sided pain. Describes pain in her left shoulder while she is sleeping on the nighttime. Also on movement of her shoulder it feels worse. No other associated symptoms. No exertional pain. CHF   The history is provided by the patient. This is a chronic problem. The problem occurs constantly. The problem has not changed since onset. Pertinent negatives include no chest pain, no abdominal pain, no headaches and no shortness of breath. The symptoms are aggravated by exertion. The symptoms are relieved by rest.   Hypertension   The history is provided by the patient. This is a chronic problem. The problem occurs constantly. The problem has not changed since onset. Pertinent negatives include no chest pain, no abdominal pain, no headaches and no shortness of breath. Review of Systems   Constitutional: Negative for chills and fever. HENT: Negative for nosebleeds. Eyes: Negative for blurred vision and double vision. Respiratory: Negative for cough, hemoptysis, sputum production, shortness of breath and wheezing. Cardiovascular: Negative for chest pain, palpitations, orthopnea, claudication, leg swelling and PND. Gastrointestinal: Negative for abdominal pain, heartburn, nausea and vomiting. Musculoskeletal: Negative for myalgias. Skin: Negative for rash. Neurological: Negative for dizziness, weakness and headaches. Endo/Heme/Allergies: Does not bruise/bleed easily.      Family History   Problem Relation Age of Onset    Other Mother         congestive heart failure    Hypertension Mother     Stroke Mother     Hypertension Father     Heart Attack Father     Hypertension Brother        Past Medical History:   Diagnosis Date    Chronic diastolic CHF (congestive heart failure) (HCC)     Chronic diastolic heart failure (HCC)     Hx of colonic polyp     Hypothyroidism     Morbid obesity ([address] 7/9/2013    Transfusion history     No history of receiving blood or blood product transfusion(s).  Unspecified hypothyroidism        Past Surgical History:   Procedure Laterality Date    HX BUNIONECTOMY      left big toe    HX COLONOSCOPY  8/26/11    HX DILATION AND CURETTAGE  2011       Social History     Tobacco Use    Smoking status: Never Smoker    Smokeless tobacco: Never Used   Substance Use Topics    Alcohol use: No     Comment: wine ocassionally lil wine       Allergies   Allergen Reactions    Ciprofloxacin Hives     Rash, nausea    Ciprofloxacin-Dexamethasone Nausea Only       Prior to Admission medications    Medication Sig Start Date End Date Taking? Authorizing Provider   hydroCHLOROthiazide (HYDRODIURIL) 12.5 mg tablet Take 1 Tab by mouth daily. 6/16/20  Yes Alexys Shcafer MD   amLODIPine (NORVASC) 5 mg tablet Take 1 Tab by mouth daily. 1/3/20  Yes Akhil Alston NP   meloxicam (MOBIC) 15 mg tablet Take 1 Tab by mouth daily. 12/18/19  Yes Venita Barrientos MD   aspirin delayed-release 81 mg tablet Take 1 Tab by mouth daily. 1/4/19  Yes Alexys Schafer MD   NAPROXEN SOD/DIPHENHYDRAM HCL (ALEVE PM PO) Take  by mouth as needed. Yes Provider, Historical   hydrocortisone-pramoxine (ANALPRAM-HC) 2.5-1 % rectal cream Apply  to affected area two (2) times a day. Patient taking differently: Apply  to affected area as needed. 12/30/13  Yes Reyna Campbell MD   levothyroxine (SYNTHROID) 100 mcg tablet Take 88 mcg by mouth Daily (before breakfast).    Yes Provider, Historical         Visit Vitals  /75   Pulse 70   Temp 97 °F (36.1 °C)   Ht 5' 8\" (1.727 m)   Wt 129.7 kg (286 lb)   BMI 43.49 kg/m²         Physical Exam   Constitutional: She is oriented to person, place, and time. She appears well-developed and well-nourished. HENT:   Head: Normocephalic and atraumatic. Eyes: Conjunctivae are normal.   Neck: Neck supple. No JVD present. No tracheal deviation present. No thyromegaly present. Cardiovascular: Normal rate and regular rhythm. PMI is not displaced. Exam reveals no gallop, no S3 and no decreased pulses. No murmur heard. Pulmonary/Chest: No respiratory distress. She has no wheezes. She has no rales. She exhibits no tenderness. Abdominal: Soft. There is no abdominal tenderness. Musculoskeletal:         General: No edema. Neurological: She is alert and oriented to person, place, and time. Skin: Skin is warm. Psychiatric: She has a normal mood and affect. Ms. Kuldeep Dunlap has a reminder for a \"due or due soon\" health maintenance. I have asked that she contact her primary care provider for follow-up on this health maintenance. No flowsheet data found. SUMMARY:echo-2018  Left ventricle: Systolic function was normal. Ejection fraction was  estimated in the range of 55 % to 60 %. There were no regional wall motion  abnormalities. There was mild concentric hypertrophy. Doppler parameters  were consistent with abnormal left ventricular relaxation (grade 1  diastolic dysfunction). Right ventricle: The size was at the upper limits of normal.    Mitral valve: There was mild annular calcification. Tricuspid valve: There was mild regurgitation. Pulmonic valve: There was mild regurgitation. NUCLEAR IMAGIN2018     Findings:   1. Stress images reveal normal Myoview distrubution in all the LV segments in short axis, vertical and horizontal long axis views. 2. Resting images have a normal uptake. 3. Gated images reveal normal wall motion and the ejection fraction is calculated to be 85%. Conclusion:   1. Normal perfusion scan. 2. Normal wall motion and ejection fraction.    3. No evidence of significant fixed or reversible defect suggesting ischemia or myocardial infarction noted from this nuclear study. 4. Low risk scan. Assessment         ICD-10-CM ICD-9-CM    1. Chronic diastolic heart failure (HCC) E59.00 690.92 METABOLIC PANEL, BASIC    Stable continue current medical management   2. Essential hypertension I10 401.9     Continue treatment monitor. Mildly elevated systolic pressure   3. Morbid obesity ([facility] [address] E66.01 278.01     Continue diet and exercise monitor   4. Acquired hypothyroidism E03.9 244.9     Stable continue treatment lab with PCP   7/2018  CHF compensated. Blood pressure is controlled. Not requiring metoprolol. Will continue to monitor with salt restriction and diet  1/2019  Stable cardiac status. Blood pressure is controlled on diet alone. Continue aspirin. Check lipids with PCP  12/2019  Noncardiac left-sided chest pain likely related to shoulder. Blood pressure elevated. Started on amlodipine 5 mg a day  6/2020  Stable cardiac status continue to monitor. I have started HCTZ 25 mg a day. This is mainly for ankle edema and mildly elevated blood pressure. She will continue to monitor and use it as needed. BMP in a few weeks  There are no discontinued medications. Orders Placed This Encounter    METABOLIC PANEL, BASIC     Standing Status:   Future     Standing Expiration Date:   7/16/2020    hydroCHLOROthiazide (HYDRODIURIL) 12.5 mg tablet     Sig: Take 1 Tab by mouth daily. Dispense:  90 Tab     Refill:  1       Follow-up and Dispositions    · Return in about 6 months (around 12/16/2020).

## 2020-06-16 NOTE — PATIENT INSTRUCTIONS
[employer] Activation Thank you for requesting access to [employer]. Please follow the instructions below to securely access and download your online medical record. [employer] allows you to send messages to your doctor, view your test results, renew your prescriptions, schedule appointments, and more. How Do I Sign Up? 1. In your internet browser, go to [email or web address]. 2. Click on the First Time User? Click Here link in the Sign In box. You will see the New Member Sign Up page. 3. Enter your [employer] Access Code exactly as it appears below. You will not need to use this code after youve completed the sign-up process. If you do not sign up before the expiration date, you must request a new code. [employer] Access Code: [email or web address] Expires: 2020  8:53 AM (This is the date your [employer] access code will ) 4. Enter the last four digits of your Social Security Number (xxxx) and Date of Birth (mm/dd/yyyy) as indicated and click Submit. You will be taken to the next sign-up page. 5. Create a [employer] ID. This will be your [employer] login ID and cannot be changed, so think of one that is secure and easy to remember. 6. Create a [employer] password. You can change your password at any time. 7. Enter your Password Reset Question and Answer. This can be used at a later time if you forget your password. 8. Enter your e-mail address. You will receive e-mail notification when new information is available in [address] 9. Click Sign Up. You can now view and download portions of your medical record. 10. Click the Download Summary menu link to download a portable copy of your medical information. Additional Information If you have questions, please visit the Frequently Asked Questions section of the [employer] website at [email or web address] Remember, [employer] is NOT to be used for urgent needs. For medical emergencies, dial 911.

## 2020-12-09 ENCOUNTER — OFFICE VISIT (OUTPATIENT)
Dept: ORTHOPEDIC SURGERY | Age: 75
End: 2020-12-09
Payer: MEDICARE

## 2020-12-09 VITALS
OXYGEN SATURATION: 96 % | DIASTOLIC BLOOD PRESSURE: 72 MMHG | TEMPERATURE: 97.7 F | BODY MASS INDEX: 42.28 KG/M2 | HEART RATE: 70 BPM | RESPIRATION RATE: 18 BRPM | SYSTOLIC BLOOD PRESSURE: 144 MMHG | HEIGHT: 68 IN | WEIGHT: 279 LBS

## 2020-12-09 DIAGNOSIS — M17.11 PRIMARY OSTEOARTHRITIS OF RIGHT KNEE: Primary | ICD-10-CM

## 2020-12-09 DIAGNOSIS — M17.12 PRIMARY OSTEOARTHRITIS OF LEFT KNEE: ICD-10-CM

## 2020-12-09 PROCEDURE — 1101F PT FALLS ASSESS-DOCD LE1/YR: CPT | Performed by: ORTHOPAEDIC SURGERY

## 2020-12-09 PROCEDURE — G8400 PT W/DXA NO RESULTS DOC: HCPCS | Performed by: ORTHOPAEDIC SURGERY

## 2020-12-09 PROCEDURE — 3017F COLORECTAL CA SCREEN DOC REV: CPT | Performed by: ORTHOPAEDIC SURGERY

## 2020-12-09 PROCEDURE — 99213 OFFICE O/P EST LOW 20 MIN: CPT | Performed by: ORTHOPAEDIC SURGERY

## 2020-12-09 PROCEDURE — G8427 DOCREV CUR MEDS BY ELIG CLIN: HCPCS | Performed by: ORTHOPAEDIC SURGERY

## 2020-12-09 PROCEDURE — G8417 CALC BMI ABV UP PARAM F/U: HCPCS | Performed by: ORTHOPAEDIC SURGERY

## 2020-12-09 PROCEDURE — G8536 NO DOC ELDER MAL SCRN: HCPCS | Performed by: ORTHOPAEDIC SURGERY

## 2020-12-09 PROCEDURE — G8432 DEP SCR NOT DOC, RNG: HCPCS | Performed by: ORTHOPAEDIC SURGERY

## 2020-12-09 PROCEDURE — 1090F PRES/ABSN URINE INCON ASSESS: CPT | Performed by: ORTHOPAEDIC SURGERY

## 2020-12-09 PROCEDURE — G8754 DIAS BP LESS 90: HCPCS | Performed by: ORTHOPAEDIC SURGERY

## 2020-12-09 PROCEDURE — G8753 SYS BP > OR = 140: HCPCS | Performed by: ORTHOPAEDIC SURGERY

## 2020-12-09 PROCEDURE — 20610 DRAIN/INJ JOINT/BURSA W/O US: CPT | Performed by: ORTHOPAEDIC SURGERY

## 2020-12-09 RX ORDER — LIOTHYRONINE SODIUM 5 UG/1
5 TABLET ORAL DAILY
COMMUNITY

## 2020-12-09 RX ORDER — DICLOFENAC SODIUM 10 MG/G
GEL TOPICAL 4 TIMES DAILY
COMMUNITY

## 2020-12-09 RX ORDER — TRIAMCINOLONE ACETONIDE 40 MG/ML
80 INJECTION, SUSPENSION INTRA-ARTICULAR; INTRAMUSCULAR ONCE
Status: COMPLETED | OUTPATIENT
Start: 2020-12-09 | End: 2020-12-09

## 2020-12-09 RX ADMIN — TRIAMCINOLONE ACETONIDE 80 MG: 40 INJECTION, SUSPENSION INTRA-ARTICULAR; INTRAMUSCULAR at 13:22

## 2020-12-09 NOTE — PROGRESS NOTES
Patient: Mauricio Gonsales                MRN: [id number]       SSN: [id number]  YOB: 1945        AGE: 76 y.o. SEX: female  Body mass index is 42.42 kg/m². PCP: Joanie Muñoz MD  12/09/20    Chief Complaint: Bilateral knee pain    Pain 5/10    HPI: Mauricio Gonsales is a 76 y.o. female patient who returns to the office today for her bilateral knee pain. She rates the pain as 5 out of 10. At her last visit which was over a year ago she had an injection into both knees and she did well. Unfortunate last few months her knee pain has returned. She describes pain on the right side in the posterior knee as well as anteriorly and mostly anteriorly on the left. The pain is worse when she walks or stands for long period time. She also reports some catching and clicking. She has known osteoarthritis in both knees. Past Medical History:   Diagnosis Date    Chronic diastolic CHF (congestive heart failure) (HCC)     Chronic diastolic heart failure (HCC)     Hx of colonic polyp     Hypothyroidism     Morbid obesity ([facility] [address] 7/9/2013    Transfusion history     No history of receiving blood or blood product transfusion(s).  Unspecified hypothyroidism        Family History   Problem Relation Age of Onset    Other Mother         congestive heart failure    Hypertension Mother     Stroke Mother     Hypertension Father     Heart Attack Father     Hypertension Brother        Current Outpatient Medications   Medication Sig Dispense Refill    liothyronine (CYTOMEL) 5 mcg tablet Take 5 mcg by mouth daily.  diclofenac (Voltaren) 1 % gel Apply  to affected area four (4) times daily.  amLODIPine (NORVASC) 5 mg tablet Take 1 Tab by mouth daily. 90 Tab 3    meloxicam (MOBIC) 15 mg tablet Take 1 Tab by mouth daily. 90 Tab 2    aspirin delayed-release 81 mg tablet Take 1 Tab by mouth daily.  100 Tab 1    levothyroxine (SYNTHROID) 100 mcg tablet Take 88 mcg by mouth Daily (before breakfast).  hydroCHLOROthiazide (HYDRODIURIL) 12.5 mg tablet Take 1 Tab by mouth daily. 90 Tab 1    NAPROXEN SOD/DIPHENHYDRAM HCL (ALEVE PM PO) Take  by mouth as needed.  hydrocortisone-pramoxine (ANALPRAM-HC) 2.5-1 % rectal cream Apply  to affected area two (2) times a day.  (Patient taking differently: Apply  to affected area as needed.) 30 g 0     Current Facility-Administered Medications   Medication Dose Route Frequency Provider Last Rate Last Dose    triamcinolone acetonide (KENALOG-40) 40 mg/mL injection 80 mg  80 mg Intra artICUlar ONCE Christiano Vargas MD           Allergies   Allergen Reactions    Ciprofloxacin Hives     Rash, nausea    Ciprofloxacin-Dexamethasone Nausea Only       Past Surgical History:   Procedure Laterality Date    HX BUNIONECTOMY      left big toe    HX COLONOSCOPY  8/26/11    HX DILATION AND CURETTAGE  2011       Social History     Socioeconomic History    Marital status: UNKNOWN     Spouse name: Not on file    Number of children: Not on file    Years of education: Not on file    Highest education level: Not on file   Occupational History    Not on file   Social Needs    Financial resource strain: Not on file    Food insecurity     Worry: Not on file     Inability: Not on file    Transportation needs     Medical: Not on file     Non-medical: Not on file   Tobacco Use    Smoking status: Never Smoker    Smokeless tobacco: Never Used   Substance and Sexual Activity    Alcohol use: No     Comment: wine ocassionally lil wine    Drug use: No    Sexual activity: Not on file   Lifestyle    Physical activity     Days per week: Not on file     Minutes per session: Not on file    Stress: Not on file   Relationships    Social connections     Talks on phone: Not on file     Gets together: Not on file     Attends [employer] service: Not on file     Active member of club or organization: Not on file     Attends meetings of clubs or organizations: Not on file Relationship status: Not on file    Intimate partner violence     Fear of current or ex partner: Not on file     Emotionally abused: Not on file     Physically abused: Not on file     Forced sexual activity: Not on file   Other Topics Concern    Not on file   Social History Narrative    Not on file       REVIEW OF SYSTEMS:      No changes from previous review of systems unless noted. PHYSICAL EXAMINATION:  Visit Vitals  BP (!) 144/72 (BP 1 Location: Left arm)   Pulse 70   Temp 97.7 °F (36.5 °C)   Resp 18   Ht 5' 8\" (1.727 m)   Wt 279 lb (126.6 kg)   SpO2 96%   BMI 42.42 kg/m²     Body mass index is 42.42 kg/m². GENERAL: Alert and oriented x3, in no acute distress. HEENT: Normocephalic, atraumatic. RESP: Non labored breathing. SKIN: No rashes or lesions noted. Knee Examination      R   L  Effusion   -   -  Warmth   -   -  Erythema   -   -  ROM   Extension  5   5   Flexion   Full   Full  Tenderness   Medial Joint  +   +   Lateral Joint  +   +   Posterior knee  +   -  Strength   Quad   5   5   Hamstring  5   5  Crepitus   Tibiofemoral   +   +   Patellofemoral  +   +  Instability   Anterior  -   -   Posterior  -   -   Patellofemoral  -   -  Lachman's   -   -  Anterior Drawer  -   -  Posterior Drawer  -   -  Mariella's   Pain   -   -   Locking  -   -  Calf TTP   -   -  Straight Leg Raise  -   -      IMAGING:  X-rays of both knees previously taken were reviewed in the office. These were taken 12/11/2019. They show severe degenerative joint disease and arthritic change in both knees. ASSESSMENT & PLAN  Diagnosis: Bilateral knee osteoarthritis, severe Delois has severe arthritic change of both knees. We discussed several treatments today including the possibility of a knee replacement surgery but at this time she would not like surgery. She would like to try another set of corticosteroid injections. She also likely has a Baker's cyst on the right knee which I would leave alone for now.   Hopefully will rupture and stop being symptomatic or the inflammation will resolve. I will see her back as needed. She tolerated the shots well.     [facility]  OFFICE PROCEDURE PROGRESS NOTE        Chart reviewed for the following:   Mera Hope MD, have reviewed the History, Physical and updated the Allergic reactions for Otilia Kam6 [facility] ([place], [place]) performed immediately prior to start of procedure:   Mera Hope MD, have performed the following reviews on Otilia Francisco prior to the start of the procedure:            * Patient was identified by name and date of birth   * Agreement on procedure being performed was verified  * Risks and Benefits explained to the patient  * Procedure site verified and marked as necessary  * Patient was positioned for comfort  * Consent was signed and verified    Time: 1:23 PM    Location: Bilateral knee    Kenalog 40mg & 3cc Lidocaine    Date of procedure: 12/9/2020    Procedure performed by:  Laurie Garner MD    Provider assisted by: Alessia Nix LPN    Patient assisted by: self    How tolerated by patient: tolerated the procedure well with no complications    Post Procedural Pain Scale: 0 - No Hurt    Comments: none                Electronically signed by: Laurie Garner MD

## 2021-01-05 ENCOUNTER — OFFICE VISIT (OUTPATIENT)
Dept: CARDIOLOGY CLINIC | Age: 76
End: 2021-01-05
Payer: MEDICARE

## 2021-01-05 VITALS
SYSTOLIC BLOOD PRESSURE: 145 MMHG | DIASTOLIC BLOOD PRESSURE: 75 MMHG | BODY MASS INDEX: 41.68 KG/M2 | WEIGHT: 275 LBS | TEMPERATURE: 97.1 F | HEART RATE: 61 BPM | HEIGHT: 68 IN

## 2021-01-05 DIAGNOSIS — I10 ESSENTIAL HYPERTENSION: ICD-10-CM

## 2021-01-05 DIAGNOSIS — E03.9 ACQUIRED HYPOTHYROIDISM: ICD-10-CM

## 2021-01-05 DIAGNOSIS — I50.32 CHRONIC DIASTOLIC HEART FAILURE (HCC): Primary | ICD-10-CM

## 2021-01-05 DIAGNOSIS — E66.01 MORBID OBESITY (HCC): ICD-10-CM

## 2021-01-05 DIAGNOSIS — I34.0 NON-RHEUMATIC MITRAL REGURGITATION: ICD-10-CM

## 2021-01-05 PROCEDURE — 1090F PRES/ABSN URINE INCON ASSESS: CPT | Performed by: INTERNAL MEDICINE

## 2021-01-05 PROCEDURE — G8417 CALC BMI ABV UP PARAM F/U: HCPCS | Performed by: INTERNAL MEDICINE

## 2021-01-05 PROCEDURE — 1101F PT FALLS ASSESS-DOCD LE1/YR: CPT | Performed by: INTERNAL MEDICINE

## 2021-01-05 PROCEDURE — G8753 SYS BP > OR = 140: HCPCS | Performed by: INTERNAL MEDICINE

## 2021-01-05 PROCEDURE — G8432 DEP SCR NOT DOC, RNG: HCPCS | Performed by: INTERNAL MEDICINE

## 2021-01-05 PROCEDURE — G8400 PT W/DXA NO RESULTS DOC: HCPCS | Performed by: INTERNAL MEDICINE

## 2021-01-05 PROCEDURE — G8754 DIAS BP LESS 90: HCPCS | Performed by: INTERNAL MEDICINE

## 2021-01-05 PROCEDURE — 99214 OFFICE O/P EST MOD 30 MIN: CPT | Performed by: INTERNAL MEDICINE

## 2021-01-05 PROCEDURE — 3017F COLORECTAL CA SCREEN DOC REV: CPT | Performed by: INTERNAL MEDICINE

## 2021-01-05 PROCEDURE — G8427 DOCREV CUR MEDS BY ELIG CLIN: HCPCS | Performed by: INTERNAL MEDICINE

## 2021-01-05 PROCEDURE — G8536 NO DOC ELDER MAL SCRN: HCPCS | Performed by: INTERNAL MEDICINE

## 2021-01-05 RX ORDER — DEXTROMETHORPHAN HYDROBROMIDE, GUAIFENESIN 5; 100 MG/5ML; MG/5ML
650 LIQUID ORAL AS NEEDED
COMMUNITY

## 2021-01-05 NOTE — PROGRESS NOTES
1. Have you been to the ER, urgent care clinic since your last visit? Hospitalized since your last visit?      no  2. Have you seen or consulted any other health care providers outside of the [address] [place] [address] since your last visit? Include any pap smears or colon screening. Yes Where: pcp     3. Since your last visit, have you had any of the following symptoms?    Leg swelling

## 2021-01-05 NOTE — PROGRESS NOTES
HISTORY OF PRESENT ILLNESS  Otilia Rojas is a 76 y.o. female. 7/2019  Denies any chest pain tightness pressure. Has complaint of weakness in the leg when she stands up and feels numb. Previous MRI from 1/2018 noted. Advised to follow-up with spine doctor again. 12/2019  Patient seen in office today with complaint of left-sided pain. Describes pain in her left shoulder while she is sleeping on the nighttime. Also on movement of her shoulder it feels worse. No other associated symptoms. No exertional pain. CHF  The history is provided by the patient. This is a chronic problem. The problem occurs constantly. The problem has not changed since onset. Pertinent negatives include no chest pain, no abdominal pain, no headaches and no shortness of breath. The symptoms are aggravated by exertion. The symptoms are relieved by rest.   Hypertension  The history is provided by the patient. This is a chronic problem. The problem occurs constantly. The problem has not changed since onset. Pertinent negatives include no chest pain, no abdominal pain, no headaches and no shortness of breath. Review of Systems   Constitutional: Negative for chills and fever. HENT: Negative for nosebleeds. Eyes: Negative for blurred vision and double vision. Respiratory: Negative for cough, hemoptysis, sputum production, shortness of breath and wheezing. Cardiovascular: Negative for chest pain, palpitations, orthopnea, claudication, leg swelling and PND. Gastrointestinal: Negative for abdominal pain, heartburn, nausea and vomiting. Musculoskeletal: Negative for myalgias. Skin: Negative for rash. Neurological: Negative for dizziness, weakness and headaches. Endo/Heme/Allergies: Does not bruise/bleed easily.      Family History   Problem Relation Age of Onset    Other Mother         congestive heart failure    Hypertension Mother     Stroke Mother     Hypertension Father     Heart Attack Father     Hypertension Brother        Past Medical History:   Diagnosis Date    Chronic diastolic CHF (congestive heart failure) (HCC)     Chronic diastolic heart failure (HCC)     Hx of colonic polyp     Hypothyroidism     Morbid obesity ([address] 7/9/2013    Transfusion history     No history of receiving blood or blood product transfusion(s).  Unspecified hypothyroidism        Past Surgical History:   Procedure Laterality Date    HX BUNIONECTOMY      left big toe    HX COLONOSCOPY  8/26/11    HX DILATION AND CURETTAGE  2011       Social History     Tobacco Use    Smoking status: Never Smoker    Smokeless tobacco: Never Used   Substance Use Topics    Alcohol use: No     Comment: wine ocassionally lil wine       Allergies   Allergen Reactions    Ciprofloxacin Hives     Rash, nausea    Ciprofloxacin-Dexamethasone Nausea Only       Prior to Admission medications    Medication Sig Start Date End Date Taking? Authorizing Provider   acetaminophen (TYLENOL) 650 mg TbER Take 650 mg by mouth as needed for Pain. Yes Provider, Historical   liothyronine (CYTOMEL) 5 mcg tablet Take 5 mcg by mouth daily. Yes Provider, Historical   diclofenac (Voltaren) 1 % gel Apply  to affected area four (4) times daily. Yes Provider, Historical   amLODIPine (NORVASC) 5 mg tablet Take 1 Tab by mouth daily. 1/3/20  Yes Akhil Alston NP   meloxicam (MOBIC) 15 mg tablet Take 1 Tab by mouth daily. 12/18/19  Yes Pop Bates MD   aspirin delayed-release 81 mg tablet Take 1 Tab by mouth daily. 1/4/19  Yes Star Dey MD   hydrocortisone-pramoxine [facility]) 2.5-1 % rectal cream Apply  to affected area two (2) times a day. Patient taking differently: Apply  to affected area as needed. 12/30/13  Yes Shaji Campbell MD   levothyroxine (SYNTHROID) 100 mcg tablet Take 88 mcg by mouth Daily (before breakfast).    Yes Provider, Historical         Visit Vitals  BP (!) 145/75   Pulse 61   Temp 97.1 °F (36.2 °C) (Temporal)   Ht 5' 8\" (1.727 m) Wt 124.7 kg (275 lb)   BMI 41.81 kg/m²         Physical Exam   Constitutional: She is oriented to person, place, and time. She appears well-developed and well-nourished. HENT:   Head: Normocephalic and atraumatic. Eyes: Conjunctivae are normal.   Neck: Neck supple. No JVD present. No tracheal deviation present. No thyromegaly present. Cardiovascular: Normal rate and regular rhythm. PMI is not displaced. Exam reveals no gallop, no S3 and no decreased pulses. No murmur heard. Pulmonary/Chest: No respiratory distress. She has no wheezes. She has no rales. She exhibits no tenderness. Abdominal: Soft. There is no abdominal tenderness. Musculoskeletal:         General: No edema. Neurological: She is alert and oriented to person, place, and time. Skin: Skin is warm. Psychiatric: She has a normal mood and affect. Ms. Darren Robert has a reminder for a \"due or due soon\" health maintenance. I have asked that she contact her primary care provider for follow-up on this health maintenance. No flowsheet data found. SUMMARY:echo-2018  Left ventricle: Systolic function was normal. Ejection fraction was  estimated in the range of 55 % to 60 %. There were no regional wall motion  abnormalities. There was mild concentric hypertrophy. Doppler parameters  were consistent with abnormal left ventricular relaxation (grade 1  diastolic dysfunction). Right ventricle: The size was at the upper limits of normal.    Mitral valve: There was mild annular calcification. Tricuspid valve: There was mild regurgitation. Pulmonic valve: There was mild regurgitation. NUCLEAR IMAGIN2018     Findings:   1. Stress images reveal normal Myoview distrubution in all the LV segments in short axis, vertical and horizontal long axis views. 2. Resting images have a normal uptake. 3. Gated images reveal normal wall motion and the ejection fraction is calculated to be 85%. Conclusion:   1. Normal perfusion scan.    2. Normal wall motion and ejection fraction. 3. No evidence of significant fixed or reversible defect suggesting ischemia or myocardial infarction noted from this nuclear study. 4. Low risk scan. Assessment         ICD-10-CM ICD-9-CM    1. Chronic diastolic heart failure (HCC)  I50.32 428.32     Stable compensated continue treatment monitor   2. Essential hypertension  I10 401.9     Continue current treatment stable monitor   3. Morbid obesity ([address]  E66.01 278.01     Continue with diet and exercise   4. Acquired hypothyroidism  E03.9 244.9     Continue current therapy lab with PCP   5. Non-rheumatic mitral regurgitation  I34.0 424.0     Mild asymptomatic monitor   7/2018  CHF compensated. Blood pressure is controlled. Not requiring metoprolol. Will continue to monitor with salt restriction and diet  1/2019  Stable cardiac status. Blood pressure is controlled on diet alone. Continue aspirin. Check lipids with PCP  12/2019  Noncardiac left-sided chest pain likely related to shoulder. Blood pressure elevated. Started on amlodipine 5 mg a day  6/2020  Stable cardiac status continue to monitor. I have started HCTZ 25 mg a day. This is mainly for ankle edema and mildly elevated blood pressure. She will continue to monitor and use it as needed. BMP in a few weeks  1/2021  Cardiac status stable. Mildly elevated blood pressure. Currently off HCTZ due to lightheadedness. Continue monitoring. Patient has continued to have progressive weight loss. Continue with dieting. Medications Discontinued During This Encounter   Medication Reason    NAPROXEN SOD/DIPHENHYDRAM HCL (ALEVE PM PO) Not A Current Medication    hydroCHLOROthiazide (HYDRODIURIL) 12.5 mg tablet Not A Current Medication       No orders of the defined types were placed in this encounter. Follow-up and Dispositions    · Return in about 6 months (around 7/5/2021).

## 2021-01-06 RX ORDER — AMLODIPINE BESYLATE 5 MG/1
TABLET ORAL
Qty: 90 TAB | Refills: 3 | Status: SHIPPED | OUTPATIENT
Start: 2021-01-06 | End: 2021-10-01

## 2021-06-02 ENCOUNTER — OFFICE VISIT (OUTPATIENT)
Dept: ORTHOPEDIC SURGERY | Age: 76
End: 2021-06-02
Payer: MEDICARE

## 2021-06-02 VITALS — HEART RATE: 75 BPM | TEMPERATURE: 97.3 F | WEIGHT: 286 LBS | BODY MASS INDEX: 44.79 KG/M2 | OXYGEN SATURATION: 100 %

## 2021-06-02 DIAGNOSIS — M17.12 PRIMARY OSTEOARTHRITIS OF LEFT KNEE: ICD-10-CM

## 2021-06-02 DIAGNOSIS — M17.11 PRIMARY OSTEOARTHRITIS OF RIGHT KNEE: Primary | ICD-10-CM

## 2021-06-02 PROCEDURE — 99213 OFFICE O/P EST LOW 20 MIN: CPT | Performed by: ORTHOPAEDIC SURGERY

## 2021-06-02 PROCEDURE — 1101F PT FALLS ASSESS-DOCD LE1/YR: CPT | Performed by: ORTHOPAEDIC SURGERY

## 2021-06-02 PROCEDURE — 1090F PRES/ABSN URINE INCON ASSESS: CPT | Performed by: ORTHOPAEDIC SURGERY

## 2021-06-02 PROCEDURE — G8756 NO BP MEASURE DOC: HCPCS | Performed by: ORTHOPAEDIC SURGERY

## 2021-06-02 PROCEDURE — G8417 CALC BMI ABV UP PARAM F/U: HCPCS | Performed by: ORTHOPAEDIC SURGERY

## 2021-06-02 PROCEDURE — G8536 NO DOC ELDER MAL SCRN: HCPCS | Performed by: ORTHOPAEDIC SURGERY

## 2021-06-02 PROCEDURE — 20610 DRAIN/INJ JOINT/BURSA W/O US: CPT | Performed by: ORTHOPAEDIC SURGERY

## 2021-06-02 PROCEDURE — G8400 PT W/DXA NO RESULTS DOC: HCPCS | Performed by: ORTHOPAEDIC SURGERY

## 2021-06-02 PROCEDURE — G8427 DOCREV CUR MEDS BY ELIG CLIN: HCPCS | Performed by: ORTHOPAEDIC SURGERY

## 2021-06-02 PROCEDURE — 73562 X-RAY EXAM OF KNEE 3: CPT | Performed by: ORTHOPAEDIC SURGERY

## 2021-06-02 PROCEDURE — G8510 SCR DEP NEG, NO PLAN REQD: HCPCS | Performed by: ORTHOPAEDIC SURGERY

## 2021-06-02 RX ORDER — TRIAMCINOLONE ACETONIDE 40 MG/ML
40 INJECTION, SUSPENSION INTRA-ARTICULAR; INTRAMUSCULAR ONCE
Status: COMPLETED | OUTPATIENT
Start: 2021-06-02 | End: 2021-06-02

## 2021-06-02 RX ADMIN — TRIAMCINOLONE ACETONIDE 40 MG: 40 INJECTION, SUSPENSION INTRA-ARTICULAR; INTRAMUSCULAR at 11:44

## 2021-06-02 NOTE — PROGRESS NOTES
Patient: Sophia Kramer                MRN: [id number]       SSN: [id number]  YOB: 1945        AGE: 68 y.o. SEX: female  Body mass index is 44.79 kg/m². PCP: Jack Ann MD  06/02/21    Chief Complaint: Bilateral knee pain 6/10    HPI: Sophia Kramer is a 68 y.o. female patient who returns to the office today with bilateral knee pain. She got about 3 months of relief from an injection in her knees at her last visit. The pain is returned. She complains of daily knee pain that is worse with walking or standing. Past Medical History:   Diagnosis Date    Chronic diastolic CHF (congestive heart failure) (HCC)     Chronic diastolic heart failure (HCC)     Dysuria     GERD (gastroesophageal reflux disease)     HTN (hypertension)     Hx of colonic polyp     Hypothyroidism     Morbid obesity ([facility] [address] 7/9/2013    Proteinuria     Transfusion history     No history of receiving blood or blood product transfusion(s).  Unspecified hypothyroidism     Urinary incontinence     UTI (urinary tract infection)        Family History   Problem Relation Age of Onset    Other Mother         congestive heart failure    Hypertension Mother     Stroke Mother     Hypertension Father     Heart Attack Father     Hypertension Brother        Current Outpatient Medications   Medication Sig Dispense Refill    amLODIPine (NORVASC) 5 mg tablet TAKE 1 TABLET EVERY DAY 90 Tab 3    acetaminophen (TYLENOL) 650 mg TbER Take 650 mg by mouth as needed for Pain.  liothyronine (CYTOMEL) 5 mcg tablet Take 5 mcg by mouth daily.  diclofenac (Voltaren) 1 % gel Apply  to affected area four (4) times daily.  meloxicam (MOBIC) 15 mg tablet Take 1 Tab by mouth daily. 90 Tab 2    aspirin delayed-release 81 mg tablet Take 1 Tab by mouth daily. 100 Tab 1    levothyroxine (SYNTHROID) 100 mcg tablet Take 88 mcg by mouth Daily (before breakfast).       hydrocortisone-pramoxine (ANALPRAM-HC) 2.5-1 % rectal cream Apply  to affected area two (2) times a day. (Patient not taking: Reported on 6/2/2021) 30 g 0       Allergies   Allergen Reactions    Ciprofloxacin Hives     Rash, nausea    Ciprofloxacin-Dexamethasone Nausea Only       Past Surgical History:   Procedure Laterality Date    HX BREAST BIOPSY      HX BUNIONECTOMY      left big toe    HX COLONOSCOPY  8/26/11    HX DILATION AND CURETTAGE  2011       Social History     Socioeconomic History    Marital status:      Spouse name: Not on file    Number of children: Not on file    Years of education: Not on file    Highest education level: Not on file   Occupational History    Not on file   Tobacco Use    Smoking status: Never Smoker    Smokeless tobacco: Never Used   Substance and Sexual Activity    Alcohol use: No     Comment: wine ocassionally lil wine    Drug use: No    Sexual activity: Not on file   Other Topics Concern    Not on file   Social History Narrative    Not on file     Social Determinants of Health     Financial Resource Strain:     Difficulty of Paying Living Expenses:    Food Insecurity:     Worried About Running Out of Food in the Last Year:     Ran Out of Food in the Last Year:    Transportation Needs:     Lack of Transportation (Medical):      Lack of Transportation (Non-Medical):    Physical Activity:     Days of Exercise per Week:     Minutes of Exercise per Session:    Stress:     Feeling of Stress :    Social Connections:     Frequency of Communication with Friends and Family:     Frequency of Social Gatherings with Friends and Family:     Attends Jewish Services:     Active Member of Clubs or Organizations:     Attends Club or Organization Meetings:     Marital Status:    Intimate Partner Violence:     Fear of Current or Ex-Partner:     Emotionally Abused:     Physically Abused:     Sexually Abused:        REVIEW OF SYSTEMS:      No changes from previous review of systems unless noted.    PHYSICAL EXAMINATION:  Visit Vitals  Pulse 75   Temp 97.3 °F (36.3 °C) (Temporal)   Wt 286 lb (129.7 kg)   SpO2 100%   BMI 44.79 kg/m²     Body mass index is 44.79 kg/m². GENERAL: Alert and oriented x3, in no acute distress. HEENT: Normocephalic, atraumatic. RESP: Non labored breathing. SKIN: No rashes or lesions noted. Knee Examination      R   L  Effusion   -   -  Warmth   -   -  Erythema   -   -  ROM   Extension  Full   Full   Flexion   Full   Full  Tenderness   Medial Joint  +   +   Lateral Joint  +   +   Posterior knee  -   -  Strength   Quad   5   5   Hamstring  5   5  Crepitus   Tibiofemoral   +   +   Patellofemoral  +   +  Instability   Anterior  -   -   Posterior  -   -   Patellofemoral  -   -  Lachman's   -   -  Anterior Drawer  -   -  Posterior Drawer  -   -  Mariella's   Pain   -   -   Locking  -   -  Calf TTP   -   -  Straight Leg Raise  -   -      IMAGING:  X-rays 3 views of both knees taken the office today which show severe tricompartmental osteoarthritis. The left knee has a valgus deformity with near complete lateral joint space collapse. The right knee has joint space collapse both medially and laterally with osteophytes. ASSESSMENT & PLAN  Diagnosis: Bilateral knee tricompartmental osteoarthritis    Otilia continues to have symptomatic bilateral knee osteoarthritis. We discussed several treatment options today and she would like to try corticosteroid injections as well as approval for hyaluronic acid injections should this be unsuccessful. I did also discussed the possibility of surgery for her which would be a total knee replacement which I would refer to another physician for the surgery.     [facility]  OFFICE PROCEDURE PROGRESS NOTE        Chart reviewed for the following:   IEmeli MD, have reviewed the History, Physical and updated the Allergic reactions for Otilia [address] [facility] performed immediately prior to start of procedure:   JOYCE Eduardo Mclaughlin MD, have performed the following reviews on Otilia Rivera prior to the start of the procedure:            * Patient was identified by name and date of birth   * Agreement on procedure being performed was verified  * Risks and Benefits explained to the patient  * Procedure site verified and marked as necessary  * Patient was positioned for comfort  * Consent was signed and verified    Time: 11:36 AM    Location: Bilateral knee joint intra-articular injections    Kenalog 40mg & 3cc Lidocaine    Date of procedure: 6/2/2021    Procedure performed by:  Eduardo Mclaughlin MD    Provider assisted by: Massimo Waite LPN    Patient assisted by: self    How tolerated by patient: tolerated the procedure well with no complications    Post Procedural Pain Scale: 0 - No Hurt    Comments: none        Electronically signed by: Eduardo Mclaughlin MD

## 2021-07-09 ENCOUNTER — DOCUMENTATION ONLY (OUTPATIENT)
Dept: ORTHOPEDIC SURGERY | Age: 76
End: 2021-07-09

## 2021-07-20 ENCOUNTER — OFFICE VISIT (OUTPATIENT)
Dept: CARDIOLOGY CLINIC | Age: 76
End: 2021-07-20
Payer: MEDICARE

## 2021-07-20 VITALS
WEIGHT: 287 LBS | BODY MASS INDEX: 45.04 KG/M2 | SYSTOLIC BLOOD PRESSURE: 156 MMHG | DIASTOLIC BLOOD PRESSURE: 80 MMHG | OXYGEN SATURATION: 97 % | HEART RATE: 65 BPM | HEIGHT: 67 IN

## 2021-07-20 DIAGNOSIS — E03.9 ACQUIRED HYPOTHYROIDISM: ICD-10-CM

## 2021-07-20 DIAGNOSIS — I10 ESSENTIAL HYPERTENSION: ICD-10-CM

## 2021-07-20 DIAGNOSIS — I34.0 NON-RHEUMATIC MITRAL REGURGITATION: ICD-10-CM

## 2021-07-20 DIAGNOSIS — I50.32 CHRONIC DIASTOLIC HEART FAILURE (HCC): Primary | ICD-10-CM

## 2021-07-20 DIAGNOSIS — E66.01 MORBID OBESITY (HCC): ICD-10-CM

## 2021-07-20 DIAGNOSIS — R07.9 CHEST PAIN, UNSPECIFIED TYPE: ICD-10-CM

## 2021-07-20 PROCEDURE — G8753 SYS BP > OR = 140: HCPCS | Performed by: INTERNAL MEDICINE

## 2021-07-20 PROCEDURE — G8754 DIAS BP LESS 90: HCPCS | Performed by: INTERNAL MEDICINE

## 2021-07-20 PROCEDURE — G8510 SCR DEP NEG, NO PLAN REQD: HCPCS | Performed by: INTERNAL MEDICINE

## 2021-07-20 PROCEDURE — 99214 OFFICE O/P EST MOD 30 MIN: CPT | Performed by: INTERNAL MEDICINE

## 2021-07-20 PROCEDURE — G8400 PT W/DXA NO RESULTS DOC: HCPCS | Performed by: INTERNAL MEDICINE

## 2021-07-20 PROCEDURE — G8427 DOCREV CUR MEDS BY ELIG CLIN: HCPCS | Performed by: INTERNAL MEDICINE

## 2021-07-20 PROCEDURE — G8417 CALC BMI ABV UP PARAM F/U: HCPCS | Performed by: INTERNAL MEDICINE

## 2021-07-20 PROCEDURE — 1101F PT FALLS ASSESS-DOCD LE1/YR: CPT | Performed by: INTERNAL MEDICINE

## 2021-07-20 PROCEDURE — G8536 NO DOC ELDER MAL SCRN: HCPCS | Performed by: INTERNAL MEDICINE

## 2021-07-20 PROCEDURE — 1090F PRES/ABSN URINE INCON ASSESS: CPT | Performed by: INTERNAL MEDICINE

## 2021-07-20 RX ORDER — FUROSEMIDE 20 MG/1
20 TABLET ORAL DAILY
Qty: 90 TABLET | Refills: 3 | Status: SHIPPED | OUTPATIENT
Start: 2021-07-20 | End: 2021-07-21 | Stop reason: SDUPTHER

## 2021-07-20 NOTE — PROGRESS NOTES
HISTORY OF PRESENT ILLNESS  Delois Alm Shone is a 68 y.o. female. 7/2019  Denies any chest pain tightness pressure. Has complaint of weakness in the leg when she stands up and feels numb. Previous MRI from 1/2018 noted. Advised to follow-up with spine doctor again. 12/2019  Patient seen in office today with complaint of left-sided pain. Describes pain in her left shoulder while she is sleeping on the nighttime. Also on movement of her shoulder it feels worse. No other associated symptoms. No exertional pain. 7/2021  Patient seen today for follow-up. Patient with episode of substernal pain more on laying down position worse on shifting position. No clear radiation. She is also short of breath after exercising for about 5 minutes. She has noted increase edema    CHF  The history is provided by the patient. This is a chronic problem. The problem occurs constantly. The problem has not changed since onset. Pertinent negatives include no chest pain, no abdominal pain, no headaches and no shortness of breath. The symptoms are aggravated by exertion. The symptoms are relieved by rest.   Hypertension  The history is provided by the patient. This is a chronic problem. The problem occurs constantly. The problem has not changed since onset. Pertinent negatives include no chest pain, no abdominal pain, no headaches and no shortness of breath. Review of Systems   Constitutional: Negative for chills and fever. HENT: Negative for nosebleeds. Eyes: Negative for blurred vision and double vision. Respiratory: Negative for cough, hemoptysis, sputum production, shortness of breath and wheezing. Cardiovascular: Negative for chest pain, palpitations, orthopnea, claudication, leg swelling and PND. Gastrointestinal: Negative for abdominal pain, heartburn, nausea and vomiting. Musculoskeletal: Negative for myalgias. Skin: Negative for rash. Neurological: Negative for dizziness, weakness and headaches. Endo/Heme/Allergies: Does not bruise/bleed easily. Family History   Problem Relation Age of Onset    Other Mother         congestive heart failure    Hypertension Mother     Stroke Mother     Hypertension Father     Heart Attack Father     Hypertension Brother        Past Medical History:   Diagnosis Date    Chronic diastolic CHF (congestive heart failure) (HCC)     Chronic diastolic heart failure (HCC)     Dysuria     GERD (gastroesophageal reflux disease)     HTN (hypertension)     Hx of colonic polyp     Hypothyroidism     Morbid obesity ([address] 7/9/2013    Proteinuria     Transfusion history     No history of receiving blood or blood product transfusion(s).  Unspecified hypothyroidism     Urinary incontinence     UTI (urinary tract infection)        Past Surgical History:   Procedure Laterality Date    HX BREAST BIOPSY      HX BUNIONECTOMY      left big toe    HX COLONOSCOPY  8/26/11    HX DILATION AND CURETTAGE  2011       Social History     Tobacco Use    Smoking status: Never Smoker    Smokeless tobacco: Never Used   Substance Use Topics    Alcohol use: No     Comment: wine ocassionally lil wine       Allergies   Allergen Reactions    Ciprofloxacin Hives     Rash, nausea    Ciprofloxacin-Dexamethasone Nausea Only       Prior to Admission medications    Medication Sig Start Date End Date Taking? Authorizing Provider   furosemide (LASIX) 20 mg tablet Take 1 Tablet by mouth daily. 7/20/21  Yes Veronica Knox MD   amLODIPine (NORVASC) 5 mg tablet TAKE 1 TABLET EVERY DAY 1/6/21  Yes Akhil Alston NP   acetaminophen (TYLENOL) 650 mg TbER Take 650 mg by mouth as needed for Pain. Yes Provider, Historical   liothyronine (CYTOMEL) 5 mcg tablet Take 5 mcg by mouth daily. Yes Provider, Historical   diclofenac (Voltaren) 1 % gel Apply  to affected area four (4) times daily. Yes Provider, Historical   meloxicam (MOBIC) 15 mg tablet Take 1 Tab by mouth daily.  12/18/19  Yes Karen Mckeon MD   aspirin delayed-release 81 mg tablet Take 1 Tab by mouth daily. 1/4/19  Yes Davis Greer MD   levothyroxine (SYNTHROID) 100 mcg tablet Take 88 mcg by mouth Daily (before breakfast). Yes Provider, Historical         Visit Vitals  BP (!) 156/80   Pulse 65   Ht 5' 7\" (1.702 m)   Wt 130.2 kg (287 lb)   SpO2 97%   BMI 44.95 kg/m²         Physical Exam  Constitutional:       Appearance: She is well-developed. HENT:      Head: Normocephalic and atraumatic. Eyes:      Conjunctiva/sclera: Conjunctivae normal.   Neck:      Thyroid: No thyromegaly. Vascular: No JVD. Trachea: No tracheal deviation. Cardiovascular:      Rate and Rhythm: Normal rate and regular rhythm. Chest Wall: PMI is not displaced. Pulses: No decreased pulses. Heart sounds: No murmur heard. No gallop. No S3 sounds. Pulmonary:      Effort: No respiratory distress. Breath sounds: No wheezing or rales. Chest:      Chest wall: No tenderness. Abdominal:      Palpations: Abdomen is soft. Tenderness: There is no abdominal tenderness. Musculoskeletal:      Cervical back: Neck supple. Skin:     General: Skin is warm. Neurological:      Mental Status: She is alert and oriented to person, place, and time. Ms. Redd Mckeon has a reminder for a \"due or due soon\" health maintenance. I have asked that she contact her primary care provider for follow-up on this health maintenance. No flowsheet data found. SUMMARY:echo-12/2018  Left ventricle: Systolic function was normal. Ejection fraction was  estimated in the range of 55 % to 60 %. There were no regional wall motion  abnormalities. There was mild concentric hypertrophy. Doppler parameters  were consistent with abnormal left ventricular relaxation (grade 1  diastolic dysfunction). Right ventricle: The size was at the upper limits of normal.    Mitral valve: There was mild annular calcification. Tricuspid valve:  There was mild regurgitation. Pulmonic valve: There was mild regurgitation. NUCLEAR IMAGIN2018     Findings:   1. Stress images reveal normal Myoview distrubution in all the LV segments in short axis, vertical and horizontal long axis views. 2. Resting images have a normal uptake. 3. Gated images reveal normal wall motion and the ejection fraction is calculated to be 85%. Conclusion:   1. Normal perfusion scan. 2. Normal wall motion and ejection fraction. 3. No evidence of significant fixed or reversible defect suggesting ischemia or myocardial infarction noted from this nuclear study. 4. Low risk scan. Assessment         ICD-10-CM ICD-9-CM    1. Chronic diastolic heart failure (HCC)  D99.56 783.88 METABOLIC PANEL, BASIC      ECHO ADULT COMPLETE      NUCLEAR CARDIAC STRESS TEST    Stable compensated continue treatment class II   2. Essential hypertension  I10 401.9     Stable continue therapy   3. Morbid obesity ([address]  E66.01 278.01     Continue dietary modification exercise   4. Acquired hypothyroidism  E03.9 244.9     Continue current treatment follow-up lab with PCP   5. Non-rheumatic mitral regurgitation  I34.0 424.0     Stable continue treatment   6. Chest pain, unspecified type  B03.9 809.44 METABOLIC PANEL, BASIC      ECHO ADULT COMPLETE      NUCLEAR CARDIAC STRESS TEST    Possible atypical angina. Possible GERD.   2018  CHF compensated. Blood pressure is controlled. Not requiring metoprolol. Will continue to monitor with salt restriction and diet  2019  Stable cardiac status. Blood pressure is controlled on diet alone. Continue aspirin. Check lipids with PCP  2019  Noncardiac left-sided chest pain likely related to shoulder. Blood pressure elevated. Started on amlodipine 5 mg a day  2020  Stable cardiac status continue to monitor. I have started HCTZ 25 mg a day. This is mainly for ankle edema and mildly elevated blood pressure. She will continue to monitor and use it as needed. BMP in a few weeks  1/2021  Cardiac status stable. Mildly elevated blood pressure. Currently off HCTZ due to lightheadedness. Continue monitoring. Patient has continued to have progressive weight loss. Continue with dieting  7/2021  Seen with increased blood pressure increase edema and chest pain. Set up for testing add Lasix 20 mg a day. Follow BMP    . Medications Discontinued During This Encounter   Medication Reason    hydrocortisone-pramoxine (ANALPRAM-HC) 2.5-1 % rectal cream Not A Current Medication       Orders Placed This Encounter    METABOLIC PANEL, BASIC     Standing Status:   Future     Standing Expiration Date:   8/19/2021    ECHO ADULT COMPLETE     Standing Status:   Future     Standing Expiration Date:   7/20/2022     Order Specific Question:   Contrast Enhancement (Bubble Study, Definity, Optison) may be used if criteria listed in established evidence-based protocol has been identified. Answer: Yes    furosemide (LASIX) 20 mg tablet     Sig: Take 1 Tablet by mouth daily. Dispense:  90 Tablet     Refill:  3       Follow-up and Dispositions    · Return for F/u after tests, Follow-up with Akhil.

## 2021-07-21 RX ORDER — FUROSEMIDE 20 MG/1
20 TABLET ORAL DAILY
Qty: 30 TABLET | Refills: 0 | Status: SHIPPED | OUTPATIENT
Start: 2021-07-21 | End: 2022-07-06

## 2021-07-29 ENCOUNTER — OFFICE VISIT (OUTPATIENT)
Dept: CARDIOLOGY CLINIC | Age: 76
End: 2021-07-29
Payer: MEDICARE

## 2021-07-29 VITALS
HEIGHT: 67 IN | BODY MASS INDEX: 44.26 KG/M2 | DIASTOLIC BLOOD PRESSURE: 74 MMHG | HEART RATE: 83 BPM | WEIGHT: 282 LBS | SYSTOLIC BLOOD PRESSURE: 136 MMHG

## 2021-07-29 DIAGNOSIS — I25.119 CORONARY ARTERY DISEASE INVOLVING NATIVE CORONARY ARTERY OF NATIVE HEART WITH ANGINA PECTORIS (HCC): Primary | ICD-10-CM

## 2021-07-29 DIAGNOSIS — E66.01 MORBID OBESITY (HCC): ICD-10-CM

## 2021-07-29 DIAGNOSIS — I34.0 NON-RHEUMATIC MITRAL REGURGITATION: ICD-10-CM

## 2021-07-29 DIAGNOSIS — I50.32 CHRONIC DIASTOLIC HEART FAILURE (HCC): ICD-10-CM

## 2021-07-29 DIAGNOSIS — I10 ESSENTIAL HYPERTENSION: ICD-10-CM

## 2021-07-29 PROCEDURE — G8536 NO DOC ELDER MAL SCRN: HCPCS | Performed by: INTERNAL MEDICINE

## 2021-07-29 PROCEDURE — G8432 DEP SCR NOT DOC, RNG: HCPCS | Performed by: INTERNAL MEDICINE

## 2021-07-29 PROCEDURE — G8427 DOCREV CUR MEDS BY ELIG CLIN: HCPCS | Performed by: INTERNAL MEDICINE

## 2021-07-29 PROCEDURE — G8400 PT W/DXA NO RESULTS DOC: HCPCS | Performed by: INTERNAL MEDICINE

## 2021-07-29 PROCEDURE — 1090F PRES/ABSN URINE INCON ASSESS: CPT | Performed by: INTERNAL MEDICINE

## 2021-07-29 PROCEDURE — G8752 SYS BP LESS 140: HCPCS | Performed by: INTERNAL MEDICINE

## 2021-07-29 PROCEDURE — G8417 CALC BMI ABV UP PARAM F/U: HCPCS | Performed by: INTERNAL MEDICINE

## 2021-07-29 PROCEDURE — 1101F PT FALLS ASSESS-DOCD LE1/YR: CPT | Performed by: INTERNAL MEDICINE

## 2021-07-29 PROCEDURE — 99214 OFFICE O/P EST MOD 30 MIN: CPT | Performed by: INTERNAL MEDICINE

## 2021-07-29 PROCEDURE — G8754 DIAS BP LESS 90: HCPCS | Performed by: INTERNAL MEDICINE

## 2021-07-29 RX ORDER — METOPROLOL SUCCINATE 25 MG/1
25 TABLET, EXTENDED RELEASE ORAL DAILY
Qty: 90 TABLET | Refills: 3 | Status: SHIPPED | OUTPATIENT
Start: 2021-07-29

## 2021-07-29 RX ORDER — ATORVASTATIN CALCIUM 20 MG/1
20 TABLET, FILM COATED ORAL DAILY
Qty: 90 TABLET | Refills: 3 | Status: SHIPPED | OUTPATIENT
Start: 2021-07-29 | End: 2021-08-02 | Stop reason: DRUGHIGH

## 2021-07-29 NOTE — PROGRESS NOTES
HISTORY OF PRESENT ILLNESS  Otilia Fitch is a 68 y.o. female. 7/2019  Denies any chest pain tightness pressure. Has complaint of weakness in the leg when she stands up and feels numb. Previous MRI from 1/2018 noted. Advised to follow-up with spine doctor again. 12/2019  Patient seen in office today with complaint of left-sided pain. Describes pain in her left shoulder while she is sleeping on the nighttime. Also on movement of her shoulder it feels worse. No other associated symptoms. No exertional pain. 7/2021  Patient seen today for follow-up. Patient with episode of substernal pain more on laying down position worse on shifting position. No clear radiation. She is also short of breath after exercising for about 5 minutes. She has noted increase edema    CHF  The history is provided by the patient. This is a chronic problem. The problem occurs constantly. The problem has not changed since onset. Pertinent negatives include no chest pain, no abdominal pain, no headaches and no shortness of breath. The symptoms are aggravated by exertion. The symptoms are relieved by rest.   Hypertension  The history is provided by the patient. This is a chronic problem. The problem occurs constantly. The problem has not changed since onset. Pertinent negatives include no chest pain, no abdominal pain, no headaches and no shortness of breath. Review of Systems   Constitutional: Negative for chills and fever. HENT: Negative for nosebleeds. Eyes: Negative for blurred vision and double vision. Respiratory: Negative for cough, hemoptysis, sputum production, shortness of breath and wheezing. Cardiovascular: Negative for chest pain, palpitations, orthopnea, claudication, leg swelling and PND. Gastrointestinal: Negative for abdominal pain, heartburn, nausea and vomiting. Musculoskeletal: Negative for myalgias. Skin: Negative for rash. Neurological: Negative for dizziness, weakness and headaches. Endo/Heme/Allergies: Does not bruise/bleed easily. Family History   Problem Relation Age of Onset    Other Mother         congestive heart failure    Hypertension Mother     Stroke Mother     Hypertension Father     Heart Attack Father     Hypertension Brother        Past Medical History:   Diagnosis Date    Chronic diastolic CHF (congestive heart failure) (HCC)     Chronic diastolic heart failure (HCC)     Dysuria     GERD (gastroesophageal reflux disease)     HTN (hypertension)     Hx of colonic polyp     Hypothyroidism     Morbid obesity ([address] 7/9/2013    Proteinuria     Transfusion history     No history of receiving blood or blood product transfusion(s).  Unspecified hypothyroidism     Urinary incontinence     UTI (urinary tract infection)        Past Surgical History:   Procedure Laterality Date    HX BREAST BIOPSY      HX BUNIONECTOMY      left big toe    HX COLONOSCOPY  8/26/11    HX DILATION AND CURETTAGE  2011       Social History     Tobacco Use    Smoking status: Never Smoker    Smokeless tobacco: Never Used   Substance Use Topics    Alcohol use: No     Comment: wine ocassionally lil wine       Allergies   Allergen Reactions    Ciprofloxacin Hives     Rash, nausea    Ciprofloxacin-Dexamethasone Nausea Only       Prior to Admission medications    Medication Sig Start Date End Date Taking? Authorizing Provider   atorvastatin (LIPITOR) 20 mg tablet Take 1 Tablet by mouth daily. 7/29/21  Yes Man Grey MD   metoprolol succinate (TOPROL-XL) 25 mg XL tablet Take 1 Tablet by mouth daily. 7/29/21  Yes Man Grey MD   furosemide (LASIX) 20 mg tablet Take 1 Tablet by mouth daily. 7/21/21  Yes Akhil Alston NP   amLODIPine (NORVASC) 5 mg tablet TAKE 1 TABLET EVERY DAY 1/6/21  Yes Akhil Alston NP   acetaminophen (TYLENOL) 650 mg TbER Take 650 mg by mouth as needed for Pain. Yes Provider, Historical   liothyronine (CYTOMEL) 5 mcg tablet Take 5 mcg by mouth daily. Yes Provider, Historical   diclofenac (Voltaren) 1 % gel Apply  to affected area four (4) times daily. Yes Provider, Historical   meloxicam (MOBIC) 15 mg tablet Take 1 Tab by mouth daily. 12/18/19  Yes Sourav Quintero MD   aspirin delayed-release 81 mg tablet Take 1 Tab by mouth daily. 1/4/19  Yes Andres Bowen MD   levothyroxine (SYNTHROID) 100 mcg tablet Take 88 mcg by mouth Daily (before breakfast). Yes Provider, Historical         Visit Vitals  /74   Pulse 83   Ht 5' 7\" (1.702 m)   Wt 127.9 kg (282 lb)   BMI 44.17 kg/m²         Physical Exam  Constitutional:       Appearance: She is well-developed. HENT:      Head: Normocephalic and atraumatic. Eyes:      Conjunctiva/sclera: Conjunctivae normal.   Neck:      Thyroid: No thyromegaly. Vascular: No JVD. Trachea: No tracheal deviation. Cardiovascular:      Rate and Rhythm: Normal rate and regular rhythm. Chest Wall: PMI is not displaced. Pulses: No decreased pulses. Heart sounds: No murmur heard. No gallop. No S3 sounds. Pulmonary:      Effort: No respiratory distress. Breath sounds: No wheezing or rales. Chest:      Chest wall: No tenderness. Abdominal:      Palpations: Abdomen is soft. Tenderness: There is no abdominal tenderness. Musculoskeletal:      Cervical back: Neck supple. Skin:     General: Skin is warm. Neurological:      Mental Status: She is alert and oriented to person, place, and time. Ms. Ruy Perez has a reminder for a \"due or due soon\" health maintenance. I have asked that she contact her primary care provider for follow-up on this health maintenance. No flowsheet data found. SUMMARY:echo-12/2018  Left ventricle: Systolic function was normal. Ejection fraction was  estimated in the range of 55 % to 60 %. There were no regional wall motion  abnormalities. There was mild concentric hypertrophy.  Doppler parameters  were consistent with abnormal left ventricular relaxation (grade 1  diastolic dysfunction). Right ventricle: The size was at the upper limits of normal.    Mitral valve: There was mild annular calcification. Tricuspid valve: There was mild regurgitation. Pulmonic valve: There was mild regurgitation. NUCLEAR IMAGIN2018     Findings:   1. Stress images reveal normal Myoview distrubution in all the LV segments in short axis, vertical and horizontal long axis views. 2. Resting images have a normal uptake. 3. Gated images reveal normal wall motion and the ejection fraction is calculated to be 85%. Conclusion:   1. Normal perfusion scan. 2. Normal wall motion and ejection fraction. 3. No evidence of significant fixed or reversible defect suggesting ischemia or myocardial infarction noted from this nuclear study. 4. Low risk scan. Monique Morelos MD Test Supervisor, ECG Baring, SPECT Baring 2021   Procedure Conclusion    Nuclear Stress Test    Nuclear Cardiac Spect Rest then Gated Stress with tomographic imaging/tomography utilized for the tomographic myocardical perfusion imaging performed. [place] Staff was used as the stressing method and agent. (Jason Staff given via a 10 - 20 sec injection.)   One day myocardial perfusion study. Date: 2021. Left ventricular perfusion is abnormal. Myocardial perfusion imaging supports a high risk stress test.   There is a prior study available for comparison. . As compared to the previous study, there are significant changes. Perfusion defects appear to be new. The left ventricular ejection fraction has decreased. Segmental wall-motion abnormalities are new. This Single Photon Emission Computer Tomograph (SPECT) study utilized tomographic imaging/tomography for the tomographic myocardial perfusion imaging performed during this study. Interpretation Summary    · SPECT: Left ventricular function post-stress was normal. Calculated ejection fraction is 77%. The TID ratio is 0.93.   · Baseline ECG: Normal EKG.  · SPECT: Myocardial perfusion imaging defect 1: There is a defect that is large in size with a moderate reduction in uptake present in the basal-apical, anteroseptal and lateral location(s) that is reversible. There is normal wall motion in the defect area. Viability in the area is good. The defect appears to be ischemia. · SPECT: Left ventricular perfusion is abnormal. Myocardial perfusion imaging supports a high risk stress test.          Assessment         ICD-10-CM ICD-9-CM    1. Coronary artery disease involving native coronary artery of native heart with angina pectoris (HCC)  Q96.633 658.39 METABOLIC PANEL, BASIC     413.9 CBC WITH AUTOMATED DIFF      PROTHROMBIN TIME + INR      LIPID PANEL      [place] CATH PLACEMENT & NJX CORONARY ART ANGIO IMG S&I    New large reversible anteroseptal anterolateral defect. High risk and Proceed with cardiac cath   2. Chronic diastolic heart failure (HCC)  I50.32 428.32     Stable class III continue treatment   3. Essential hypertension  I10 401.9     Stable monitor   4. Morbid obesity ([facility] [address]  E66.01 278.01     Continue dietary modification   5. Non-rheumatic mitral regurgitation  I34.0 424.0     Stable monitor   7/2018  CHF compensated. Blood pressure is controlled. Not requiring metoprolol. Will continue to monitor with salt restriction and diet  1/2019  Stable cardiac status. Blood pressure is controlled on diet alone. Continue aspirin. Check lipids with PCP  12/2019  Noncardiac left-sided chest pain likely related to shoulder. Blood pressure elevated. Started on amlodipine 5 mg a day  6/2020  Stable cardiac status continue to monitor. I have started HCTZ 25 mg a day. This is mainly for ankle edema and mildly elevated blood pressure. She will continue to monitor and use it as needed. BMP in a few weeks  1/2021  Cardiac status stable. Mildly elevated blood pressure. Currently off HCTZ due to lightheadedness. Continue monitoring.   Patient has continued to have progressive weight loss. Continue with dieting  7/2021  Seen with increased blood pressure increase edema and chest pain. Set up for testing add Lasix 20 mg a day. Follow [facility]  2021-07-29  Patient seen today for follow-up. Likely has atypical angina and progressive symptom. Large reversible defect of anterior anteroseptal anterolateral wall. Proceed with cardiac catheterization  Check labs. Add statin add beta-blocker  THE PATIENT UNDERSTANDS THAT ALTHOUGH RARE, SEVERE  UNEXPECTED COMPLICATIONS CAN OCCUR WITH EACH TYPE OF CARDIAC CATH PROCEDURE. THESE RISKS INCLUDE,  BUT ARE NOT LIMITED TO: ALLERGIC REACTION, INFECTION, BLEEDING, BLOOD VESSEL INJURY,   KIDNEY INJURY FROM X-RAY DYE, PUNCTURE OF THE HEART/LUNGS,   EMERGENT OPEN HEART SURGERY, HEART ATTACK, STROKE, CARDIAC  ARREST OR DEATH OR NEED FOR EMERGENCY CARDIAC BYPASS SURGERY       There are no discontinued medications. Orders Placed This Encounter    METABOLIC PANEL, BASIC     Standing Status:   Future     Standing Expiration Date:   8/28/2021    CBC WITH AUTOMATED DIFF     Standing Status:   Future     Standing Expiration Date:   8/28/2021    PROTHROMBIN TIME + INR     Standing Status:   Future     Standing Expiration Date:   8/28/2021    LIPID PANEL     Standing Status:   Future     Standing Expiration Date:   1/27/2022    [place] CATH PLACEMENT & NJX CORONARY ART ANGIO IMG S&I    atorvastatin (LIPITOR) 20 mg tablet     Sig: Take 1 Tablet by mouth daily. Dispense:  90 Tablet     Refill:  3    metoprolol succinate (TOPROL-XL) 25 mg XL tablet     Sig: Take 1 Tablet by mouth daily. Dispense:  90 Tablet     Refill:  3       Follow-up and Dispositions    · Return in about 4 weeks (around 8/26/2021).

## 2021-07-29 NOTE — PATIENT INSTRUCTIONS
Coronary Angiogram with Possible Treatment: Before Your Procedure  What is a coronary angiogram?     A coronary angiogram is a test to look at the blood vessels of your heart. These are called the coronary arteries. You may have this test to see if any of these arteries are narrowed or blocked. The test may also be used to measure the pressure in your heart's chambers. A doctor will put a thin, flexible tube into a blood vessel in your upper leg or groin. This tube is called a catheter. In some cases, the doctor may insert it in a blood vessel near your elbow or wrist.  During the test, the doctor moves the catheter through the blood vessel and into your heart. Then the doctor puts a dye into the catheter. This makes your coronary arteries show up on a screen. Your doctor can see if the arteries are blocked or narrowed. If you have a narrowed or blocked artery, the doctor may do an angioplasty or a coronary stent procedure. In an angioplasty, the doctor puts a catheter with a tiny balloon at the tip into the blocked area and inflates it. The balloon presses the fatty buildup (plaque) against the walls of the artery. This makes more room for blood to flow. In most cases, the doctor then puts a stent in the artery. A stent is a small, expandable tube. It presses against the walls of the artery. The stent is left in the artery to keep it open. This helps blood flow. The catheter is removed from your body. Follow-up care is a key part of your treatment and safety. Be sure to make and go to all appointments, and call your doctor if you are having problems. It's also a good idea to know your test results and keep a list of the medicines you take. How do you prepare for the procedure? Procedures can be stressful. This information will help you understand what you can expect. And it will help you safely prepare for your procedure. Preparing for the procedure    · Be sure you have someone to take you home. Anesthesia and pain medicine will make it unsafe for you to drive or get home on your own.     · Understand exactly what procedure is planned, along with the risks, benefits, and other options. · Tell your doctor ALL the medicines, vitamins, supplements, and herbal remedies you take. Some may increase the risk of problems during your procedure. Your doctor will tell you if you should stop taking any of them before the procedure and how soon to do it.     · If you take aspirin or some other blood thinner, ask your doctor if you should stop taking it before your procedure. Make sure that you understand exactly what your doctor wants you to do. These medicines increase the risk of bleeding.     · Make sure your doctor and the hospital have a copy of your advance directive. If you don't have one, you may want to prepare one. It lets others know your health care wishes. It's a good thing to have before any type of surgery or procedure. What happens on the day of the procedure? · Follow the instructions exactly about when to stop eating and drinking. If you don't, your procedure may be canceled. If your doctor told you to take your medicines on the day of the procedure, take them with only a sip of water.     · Take a bath or shower before you come in for your procedure. Do not apply lotions, perfumes, deodorants, or nail polish.     · Do not shave the procedure site yourself.     · Take off all jewelry and piercings. And take out contact lenses, if you wear them. At the hospital or surgery center   · Bring a picture ID.     · You will be kept comfortable and safe by your anesthesia provider. You may get medicine that relaxes you or puts you in a light sleep. The area being worked on will be numb.     · After the procedure, pressure will be applied to the area where the catheter was put into your artery. Then you may have a bandage or a compression device on your groin or arm at the catheter insertion site.  This will prevent bleeding.     · Nurses will check your heart rate and blood pressure. The nurse also will check the catheter site for bleeding.     · If the catheter was put in your groin, you will need to lie still and keep your leg straight for several hours. The nurse may put a weighted bag on your leg to help you keep it still.     · If the catheter was put in your arm, you may be able to sit up and get out of bed right away. But you will need to keep your arm still for at least one hour.     · You may be able to go home later the same day, or you may need to stay in the hospital overnight.     · You may have a bruise where the catheter was put in your groin or arm. This is normal and will go away. When should you call your doctor? · You have questions or concerns.     · You don't understand how to prepare for your procedure.     · You become ill before the procedure (such as fever, flu, or a cold).     · You need to reschedule or have changed your mind about having the procedure. Where can you learn more? Go to http://www.gray.com/  Enter C330 in the search box to learn more about \"Coronary Angiogram with Possible Treatment: Before Your Procedure. \"  Current as of: August 31, 2020               Content Version: 12.8  © 5792-0410 [employer]. Care instructions adapted under license by [employer] (which disclaims liability or warranty for this information). If you have questions about a medical condition or this instruction, always ask your healthcare professional. [place] [address] any warranty or liability for your use of this information. Instructions    Patients Name:  Joelle Roche    1. [facility] at           .       2. Please go to [facility] and park in the outpatient parking lot that is located around to the back of the hospital and enter through the [place]. Once you enter through the [facility] check in with the  there. The  will either give you directions or assist you in getting to the cath holding area. 3. You are not to eat anything after midnight before the procedure. Please continue to drink fluids up until 12:00.  (water, juice, black coffee, soft drinks). Do not drink milk or milk products or anything with cream. You may take medications(except for diabetes) with a small sip of water before 6am on the day of the procedure. .    4. If you are diabetic, do not take your insulin/sugar pill the morning of the procedure. 5. MEDICATION INSTRUCTIONS:   Please take your morning medications with the following special instructions:      [x]          Please make sure to take your Blood pressure medication : With just enough water to swallow. [x]          Take your Aspirin and/or Plavix. With just enough water to swallow. []          Stop your Coumadin on   and do not resume it until after the procedure.      []          Take Prednisone 60 mg and Benadryl 25 mg by mouth at Bedtime on  and again on  at . This is to prevent you from having an allergic reaction to the dye. 6. We encourage families to wait in the waiting room on the first floor while the procedure is being done. The Doctor will come out and talk with you as soon as the procedure is over. 7. There is the possibility that you may spend the night in the hospital, depending on the results of the procedure. This will be determined after the procedure is done. If angioplasty or stent is planned, you will stay at least one day. 8. If you or your family have any questions, please call our office Monday Friday, 9:00 a. m.4:30 p.m.,  At [phone], and ask to speak to one of the nurses.

## 2021-07-29 NOTE — H&P (VIEW-ONLY)
HISTORY OF PRESENT ILLNESS  Otilia Dillon is a 68 y.o. female. 7/2019  Denies any chest pain tightness pressure. Has complaint of weakness in the leg when she stands up and feels numb. Previous MRI from 1/2018 noted. Advised to follow-up with spine doctor again. 12/2019  Patient seen in office today with complaint of left-sided pain. Describes pain in her left shoulder while she is sleeping on the nighttime. Also on movement of her shoulder it feels worse. No other associated symptoms. No exertional pain. 7/2021  Patient seen today for follow-up. Patient with episode of substernal pain more on laying down position worse on shifting position. No clear radiation. She is also short of breath after exercising for about 5 minutes. She has noted increase edema    CHF  The history is provided by the patient. This is a chronic problem. The problem occurs constantly. The problem has not changed since onset. Pertinent negatives include no chest pain, no abdominal pain, no headaches and no shortness of breath. The symptoms are aggravated by exertion. The symptoms are relieved by rest.   Hypertension  The history is provided by the patient. This is a chronic problem. The problem occurs constantly. The problem has not changed since onset. Pertinent negatives include no chest pain, no abdominal pain, no headaches and no shortness of breath. Review of Systems   Constitutional: Negative for chills and fever. HENT: Negative for nosebleeds. Eyes: Negative for blurred vision and double vision. Respiratory: Negative for cough, hemoptysis, sputum production, shortness of breath and wheezing. Cardiovascular: Negative for chest pain, palpitations, orthopnea, claudication, leg swelling and PND. Gastrointestinal: Negative for abdominal pain, heartburn, nausea and vomiting. Musculoskeletal: Negative for myalgias. Skin: Negative for rash. Neurological: Negative for dizziness, weakness and headaches. Endo/Heme/Allergies: Does not bruise/bleed easily. Family History   Problem Relation Age of Onset    Other Mother         congestive heart failure    Hypertension Mother     Stroke Mother     Hypertension Father     Heart Attack Father     Hypertension Brother        Past Medical History:   Diagnosis Date    Chronic diastolic CHF (congestive heart failure) (HCC)     Chronic diastolic heart failure (HCC)     Dysuria     GERD (gastroesophageal reflux disease)     HTN (hypertension)     Hx of colonic polyp     Hypothyroidism     Morbid obesity ([address] 7/9/2013    Proteinuria     Transfusion history     No history of receiving blood or blood product transfusion(s).  Unspecified hypothyroidism     Urinary incontinence     UTI (urinary tract infection)        Past Surgical History:   Procedure Laterality Date    HX BREAST BIOPSY      HX BUNIONECTOMY      left big toe    HX COLONOSCOPY  8/26/11    HX DILATION AND CURETTAGE  2011       Social History     Tobacco Use    Smoking status: Never Smoker    Smokeless tobacco: Never Used   Substance Use Topics    Alcohol use: No     Comment: wine ocassionally lil wine       Allergies   Allergen Reactions    Ciprofloxacin Hives     Rash, nausea    Ciprofloxacin-Dexamethasone Nausea Only       Prior to Admission medications    Medication Sig Start Date End Date Taking? Authorizing Provider   atorvastatin (LIPITOR) 20 mg tablet Take 1 Tablet by mouth daily. 7/29/21  Yes Lloyd Vila MD   metoprolol succinate (TOPROL-XL) 25 mg XL tablet Take 1 Tablet by mouth daily. 7/29/21  Yes Lloyd Vila MD   furosemide (LASIX) 20 mg tablet Take 1 Tablet by mouth daily. 7/21/21  Yes Akhil Alston NP   amLODIPine (NORVASC) 5 mg tablet TAKE 1 TABLET EVERY DAY 1/6/21  Yes Akhil Alston NP   acetaminophen (TYLENOL) 650 mg TbER Take 650 mg by mouth as needed for Pain. Yes Provider, Historical   liothyronine (CYTOMEL) 5 mcg tablet Take 5 mcg by mouth daily. Yes Provider, Historical   diclofenac (Voltaren) 1 % gel Apply  to affected area four (4) times daily. Yes Provider, Historical   meloxicam (MOBIC) 15 mg tablet Take 1 Tab by mouth daily. 12/18/19  Yes Tri Hebert MD   aspirin delayed-release 81 mg tablet Take 1 Tab by mouth daily. 1/4/19  Yes Bertha Felipe MD   levothyroxine (SYNTHROID) 100 mcg tablet Take 88 mcg by mouth Daily (before breakfast). Yes Provider, Historical         Visit Vitals  /74   Pulse 83   Ht 5' 7\" (1.702 m)   Wt 127.9 kg (282 lb)   BMI 44.17 kg/m²         Physical Exam  Constitutional:       Appearance: She is well-developed. HENT:      Head: Normocephalic and atraumatic. Eyes:      Conjunctiva/sclera: Conjunctivae normal.   Neck:      Thyroid: No thyromegaly. Vascular: No JVD. Trachea: No tracheal deviation. Cardiovascular:      Rate and Rhythm: Normal rate and regular rhythm. Chest Wall: PMI is not displaced. Pulses: No decreased pulses. Heart sounds: No murmur heard. No gallop. No S3 sounds. Pulmonary:      Effort: No respiratory distress. Breath sounds: No wheezing or rales. Chest:      Chest wall: No tenderness. Abdominal:      Palpations: Abdomen is soft. Tenderness: There is no abdominal tenderness. Musculoskeletal:      Cervical back: Neck supple. Skin:     General: Skin is warm. Neurological:      Mental Status: She is alert and oriented to person, place, and time. Ms. Shae Mathis has a reminder for a \"due or due soon\" health maintenance. I have asked that she contact her primary care provider for follow-up on this health maintenance. No flowsheet data found. SUMMARY:echo-12/2018  Left ventricle: Systolic function was normal. Ejection fraction was  estimated in the range of 55 % to 60 %. There were no regional wall motion  abnormalities. There was mild concentric hypertrophy.  Doppler parameters  were consistent with abnormal left ventricular relaxation (grade 1  diastolic dysfunction). Right ventricle: The size was at the upper limits of normal.    Mitral valve: There was mild annular calcification. Tricuspid valve: There was mild regurgitation. Pulmonic valve: There was mild regurgitation. NUCLEAR IMAGIN2018     Findings:   1. Stress images reveal normal Myoview distrubution in all the LV segments in short axis, vertical and horizontal long axis views. 2. Resting images have a normal uptake. 3. Gated images reveal normal wall motion and the ejection fraction is calculated to be 85%. Conclusion:   1. Normal perfusion scan. 2. Normal wall motion and ejection fraction. 3. No evidence of significant fixed or reversible defect suggesting ischemia or myocardial infarction noted from this nuclear study. 4. Low risk scan. Cecile Farrell MD Test Supervisor, ECG [place], SPECT [place] 2021   Procedure Conclusion    Nuclear Stress Test    Nuclear Cardiac Spect Rest then Gated Stress with tomographic imaging/tomography utilized for the tomographic myocardical perfusion imaging performed. Arrington Yumiko was used as the stressing method and agent. (Arrington Snyder given via a 10 - 20 sec injection.)   One day myocardial perfusion study. Date: 2021. Left ventricular perfusion is abnormal. Myocardial perfusion imaging supports a high risk stress test.   There is a prior study available for comparison. . As compared to the previous study, there are significant changes. Perfusion defects appear to be new. The left ventricular ejection fraction has decreased. Segmental wall-motion abnormalities are new. This Single Photon Emission Computer Tomograph (SPECT) study utilized tomographic imaging/tomography for the tomographic myocardial perfusion imaging performed during this study. Interpretation Summary    · SPECT: Left ventricular function post-stress was normal. Calculated ejection fraction is 77%. The TID ratio is 0.93.   · Baseline ECG: Normal EKG.  · SPECT: Myocardial perfusion imaging defect 1: There is a defect that is large in size with a moderate reduction in uptake present in the basal-apical, anteroseptal and lateral location(s) that is reversible. There is normal wall motion in the defect area. Viability in the area is good. The defect appears to be ischemia. · SPECT: Left ventricular perfusion is abnormal. Myocardial perfusion imaging supports a high risk stress test.          Assessment         ICD-10-CM ICD-9-CM    1. Coronary artery disease involving native coronary artery of native heart with angina pectoris (HCC)  P46.398 737.61 METABOLIC PANEL, BASIC     413.9 CBC WITH AUTOMATED DIFF      PROTHROMBIN TIME + INR      LIPID PANEL      MD CATH PLACEMENT & NJX CORONARY ART ANGIO IMG S&I    New large reversible anteroseptal anterolateral defect. High risk and Proceed with cardiac cath   2. Chronic diastolic heart failure (HCC)  I50.32 428.32     Stable class III continue treatment   3. Essential hypertension  I10 401.9     Stable monitor   4. Morbid obesity ([address]  E66.01 278.01     Continue dietary modification   5. Non-rheumatic mitral regurgitation  I34.0 424.0     Stable monitor   7/2018  CHF compensated. Blood pressure is controlled. Not requiring metoprolol. Will continue to monitor with salt restriction and diet  1/2019  Stable cardiac status. Blood pressure is controlled on diet alone. Continue aspirin. Check lipids with PCP  12/2019  Noncardiac left-sided chest pain likely related to shoulder. Blood pressure elevated. Started on amlodipine 5 mg a day  6/2020  Stable cardiac status continue to monitor. I have started HCTZ 25 mg a day. This is mainly for ankle edema and mildly elevated blood pressure. She will continue to monitor and use it as needed. BMP in a few weeks  1/2021  Cardiac status stable. Mildly elevated blood pressure. Currently off HCTZ due to lightheadedness. Continue monitoring.   Patient has continued to have progressive weight loss. Continue with dieting  7/2021  Seen with increased blood pressure increase edema and chest pain. Set up for testing add Lasix 20 mg a day. Follow [facility]  2021-07-29  Patient seen today for follow-up. Likely has atypical angina and progressive symptom. Large reversible defect of anterior anteroseptal anterolateral wall. Proceed with cardiac catheterization  Check labs. Add statin add beta-blocker  THE PATIENT UNDERSTANDS THAT ALTHOUGH RARE, SEVERE  UNEXPECTED COMPLICATIONS CAN OCCUR WITH EACH TYPE OF CARDIAC CATH PROCEDURE. THESE RISKS INCLUDE,  BUT ARE NOT LIMITED TO: ALLERGIC REACTION, INFECTION, BLEEDING, BLOOD VESSEL INJURY,   KIDNEY INJURY FROM X-RAY DYE, PUNCTURE OF THE HEART/LUNGS,   EMERGENT OPEN HEART SURGERY, HEART ATTACK, STROKE, CARDIAC  ARREST OR DEATH OR NEED FOR EMERGENCY CARDIAC BYPASS SURGERY       There are no discontinued medications. Orders Placed This Encounter    METABOLIC PANEL, BASIC     Standing Status:   Future     Standing Expiration Date:   8/28/2021    CBC WITH AUTOMATED DIFF     Standing Status:   Future     Standing Expiration Date:   8/28/2021    PROTHROMBIN TIME + INR     Standing Status:   Future     Standing Expiration Date:   8/28/2021    LIPID PANEL     Standing Status:   Future     Standing Expiration Date:   1/27/2022    OR CATH PLACEMENT & NJX CORONARY ART ANGIO IMG S&I    atorvastatin (LIPITOR) 20 mg tablet     Sig: Take 1 Tablet by mouth daily. Dispense:  90 Tablet     Refill:  3    metoprolol succinate (TOPROL-XL) 25 mg XL tablet     Sig: Take 1 Tablet by mouth daily. Dispense:  90 Tablet     Refill:  3       Follow-up and Dispositions    · Return in about 4 weeks (around 8/26/2021).

## 2021-07-29 NOTE — PROGRESS NOTES
1. Have you been to the ER, urgent care clinic since your last visit? Hospitalized since your last visit?    no    2. Have you seen or consulted any other health care providers outside of the [address] [place] [address] since your last visit? Include any pap smears or colon screening.       No

## 2021-07-30 ENCOUNTER — HOSPITAL ENCOUNTER (OUTPATIENT)
Dept: LAB | Age: 76
Discharge: HOME | End: 2021-07-30
Payer: MEDICARE

## 2021-07-30 DIAGNOSIS — I25.119 CORONARY ARTERY DISEASE INVOLVING NATIVE CORONARY ARTERY OF NATIVE HEART WITH ANGINA PECTORIS (HCC): ICD-10-CM

## 2021-07-30 LAB
ANION GAP SERPL CALC-SCNC: 4 MMOL/L (ref 3–18)
BASOPHILS # BLD: 0 K/UL (ref 0–0.1)
BASOPHILS NFR BLD: 1 % (ref 0–2)
BUN SERPL-MCNC: 14 MG/DL (ref 7–18)
BUN/CREAT SERPL: 16 (ref 12–20)
CALCIUM SERPL-MCNC: 9.6 MG/DL (ref 8.5–10.1)
CHLORIDE SERPL-SCNC: 104 MMOL/L (ref 100–111)
CHOLEST SERPL-MCNC: 214 MG/DL
CO2 SERPL-SCNC: 31 MMOL/L (ref 21–32)
CREAT SERPL-MCNC: 0.89 MG/DL (ref 0.6–1.3)
DIFFERENTIAL METHOD BLD: ABNORMAL
EOSINOPHIL # BLD: 0.1 K/UL (ref 0–0.4)
EOSINOPHIL NFR BLD: 1 % (ref 0–5)
ERYTHROCYTE [DISTWIDTH] IN BLOOD BY AUTOMATED COUNT: 14.9 % (ref 11.6–14.5)
GLUCOSE SERPL-MCNC: 89 MG/DL (ref 74–99)
HCT VFR BLD AUTO: 40.2 % (ref 35–45)
HDLC SERPL-MCNC: 49 MG/DL (ref 40–60)
HDLC SERPL: 4.4 {RATIO} (ref 0–5)
HGB BLD-MCNC: 12.6 G/DL (ref 12–16)
INR PPP: 1.1 (ref 0.8–1.2)
LDLC SERPL CALC-MCNC: 149 MG/DL (ref 0–100)
LIPID PROFILE,FLP: ABNORMAL
LYMPHOCYTES # BLD: 1.3 K/UL (ref 0.9–3.6)
LYMPHOCYTES NFR BLD: 20 % (ref 21–52)
MCH RBC QN AUTO: 27.9 PG (ref 24–34)
MCHC RBC AUTO-ENTMCNC: 31.3 G/DL (ref 31–37)
MCV RBC AUTO: 89.1 FL (ref 74–97)
MONOCYTES # BLD: 0.4 K/UL (ref 0.05–1.2)
MONOCYTES NFR BLD: 7 % (ref 3–10)
NEUTS SEG # BLD: 4.6 K/UL (ref 1.8–8)
NEUTS SEG NFR BLD: 71 % (ref 40–73)
PLATELET # BLD AUTO: 323 K/UL (ref 135–420)
PMV BLD AUTO: 10.9 FL (ref 9.2–11.8)
POTASSIUM SERPL-SCNC: 4.2 MMOL/L (ref 3.5–5.5)
PROTHROMBIN TIME: 13.7 SEC (ref 11.5–15.2)
RBC # BLD AUTO: 4.51 M/UL (ref 4.2–5.3)
SODIUM SERPL-SCNC: 139 MMOL/L (ref 136–145)
TRIGL SERPL-MCNC: 80 MG/DL (ref ?–150)
VLDLC SERPL CALC-MCNC: 16 MG/DL
WBC # BLD AUTO: 6.5 K/UL (ref 4.6–13.2)

## 2021-07-30 PROCEDURE — 85025 COMPLETE CBC W/AUTO DIFF WBC: CPT

## 2021-07-30 PROCEDURE — 80048 BASIC METABOLIC PNL TOTAL CA: CPT

## 2021-07-30 PROCEDURE — 85610 PROTHROMBIN TIME: CPT

## 2021-07-30 PROCEDURE — 80061 LIPID PANEL: CPT

## 2021-07-30 PROCEDURE — 36415 COLL VENOUS BLD VENIPUNCTURE: CPT

## 2021-07-30 NOTE — PROGRESS NOTES
Called patient, verified name and date of birth. Informed patient that lab results were in and advised of previous result note from Dr. Deena Khoury  . Patient verbalized understanding and had no further questions.

## 2021-08-02 ENCOUNTER — TELEPHONE (OUTPATIENT)
Dept: CARDIOLOGY CLINIC | Age: 76
End: 2021-08-02

## 2021-08-02 DIAGNOSIS — I10 ESSENTIAL HYPERTENSION: Primary | ICD-10-CM

## 2021-08-02 DIAGNOSIS — I25.119 CORONARY ARTERY DISEASE INVOLVING NATIVE CORONARY ARTERY OF NATIVE HEART WITH ANGINA PECTORIS (HCC): ICD-10-CM

## 2021-08-02 RX ORDER — ATORVASTATIN CALCIUM 80 MG/1
80 TABLET, FILM COATED ORAL DAILY
Qty: 30 TABLET | Refills: 3 | Status: SHIPPED | OUTPATIENT
Start: 2021-08-02 | End: 2021-08-31

## 2021-08-02 NOTE — TELEPHONE ENCOUNTER
Spoke with patient per Dr. Deena Khoury, Atorvastatin will not cause leg cramps all the time. Only a few percentage of people have that. All statin has then side effect but most people do not have side effects. She voices understanding and acceptance of this advice and will call back if any further questions or concerns.

## 2021-08-02 NOTE — TELEPHONE ENCOUNTER
Atorvastatin will not cause leg cramps all the time. Only a few percentage of people have that.   All statin has that side effect but most people do not have side effects

## 2021-08-02 NOTE — TELEPHONE ENCOUNTER
Called and left message on patient voicemail regarding lab/test per Dr. Dwayne Gaffney, lab reviewed High LDL , Increase Atorvastatin to 80 mg a day. LFT/Lipid in 6 weeks. BMP/CBC no significant abnormality. If any questions to call office.

## 2021-08-02 NOTE — TELEPHONE ENCOUNTER
----- Message from Lola Garcia MD sent at 7/30/2021 12:24 PM EDT -----  Lab/test  reviewed  No significant abnormality

## 2021-08-02 NOTE — TELEPHONE ENCOUNTER
Patient called and stated she picked up her lipitor but have not started it yet because she was reading the side effects and state she already have problems with her legs and she saw that one of the side effects is leg weakness. She would like to know if there is another medication she can take.

## 2021-08-03 ENCOUNTER — HOSPITAL ENCOUNTER (OUTPATIENT)
Age: 76
Setting detail: OUTPATIENT SURGERY
Discharge: HOME | End: 2021-08-03
Attending: INTERNAL MEDICINE | Admitting: INTERNAL MEDICINE
Payer: MEDICARE

## 2021-08-03 VITALS
SYSTOLIC BLOOD PRESSURE: 142 MMHG | HEIGHT: 67 IN | DIASTOLIC BLOOD PRESSURE: 68 MMHG | BODY MASS INDEX: 44.26 KG/M2 | WEIGHT: 282 LBS | RESPIRATION RATE: 18 BRPM | HEART RATE: 56 BPM | TEMPERATURE: 98.4 F | OXYGEN SATURATION: 99 %

## 2021-08-03 DIAGNOSIS — I25.10 CAD (CORONARY ARTERY DISEASE): ICD-10-CM

## 2021-08-03 DIAGNOSIS — I25.118 CORONARY ARTERY DISEASE INVOLVING NATIVE CORONARY ARTERY OF NATIVE HEART WITH OTHER FORM OF ANGINA PECTORIS (HCC): ICD-10-CM

## 2021-08-03 PROBLEM — E03.9 UNSPECIFIED HYPOTHYROIDISM: Status: RESOLVED | Noted: 2021-08-03 | Resolved: 2021-08-03

## 2021-08-03 PROBLEM — I25.119 CORONARY ARTERY DISEASE INVOLVING NATIVE CORONARY ARTERY OF NATIVE HEART WITH ANGINA PECTORIS (HCC): Status: ACTIVE | Noted: 2021-08-03

## 2021-08-03 PROCEDURE — 77030013797 HC KT TRNSDUC PRSSR EDWD -A: Performed by: INTERNAL MEDICINE

## 2021-08-03 PROCEDURE — 99152 MOD SED SAME PHYS/QHP 5/>YRS: CPT | Performed by: INTERNAL MEDICINE

## 2021-08-03 PROCEDURE — 77030027845 HC BND COM RDL D-STAT TELE -B: Performed by: INTERNAL MEDICINE

## 2021-08-03 PROCEDURE — C1894 INTRO/SHEATH, NON-LASER: HCPCS | Performed by: INTERNAL MEDICINE

## 2021-08-03 PROCEDURE — 93454 CORONARY ARTERY ANGIO S&I: CPT | Performed by: INTERNAL MEDICINE

## 2021-08-03 PROCEDURE — 74011000636 HC RX REV CODE- 636: Performed by: INTERNAL MEDICINE

## 2021-08-03 PROCEDURE — 74011000250 HC RX REV CODE- 250: Performed by: INTERNAL MEDICINE

## 2021-08-03 PROCEDURE — 74011250636 HC RX REV CODE- 250/636: Performed by: INTERNAL MEDICINE

## 2021-08-03 PROCEDURE — 77030015766: Performed by: INTERNAL MEDICINE

## 2021-08-03 RX ORDER — NITROGLYCERIN 0.4 MG/1
0.4 TABLET SUBLINGUAL
Status: DISCONTINUED | OUTPATIENT
Start: 2021-08-03 | End: 2021-08-03 | Stop reason: HOSPADM

## 2021-08-03 RX ORDER — SODIUM CHLORIDE 9 MG/ML
100 INJECTION, SOLUTION INTRAVENOUS CONTINUOUS
Status: DISPENSED | OUTPATIENT
Start: 2021-08-03 | End: 2021-08-03

## 2021-08-03 RX ORDER — SODIUM CHLORIDE 0.9 % (FLUSH) 0.9 %
5-40 SYRINGE (ML) INJECTION EVERY 8 HOURS
Status: DISCONTINUED | OUTPATIENT
Start: 2021-08-03 | End: 2021-08-03 | Stop reason: HOSPADM

## 2021-08-03 RX ORDER — VERAPAMIL HYDROCHLORIDE 2.5 MG/ML
INJECTION, SOLUTION INTRAVENOUS AS NEEDED
Status: DISCONTINUED | OUTPATIENT
Start: 2021-08-03 | End: 2021-08-03 | Stop reason: HOSPADM

## 2021-08-03 RX ORDER — SODIUM CHLORIDE 0.9 % (FLUSH) 0.9 %
5-40 SYRINGE (ML) INJECTION AS NEEDED
Status: DISCONTINUED | OUTPATIENT
Start: 2021-08-03 | End: 2021-08-03 | Stop reason: HOSPADM

## 2021-08-03 RX ORDER — LIDOCAINE HYDROCHLORIDE 10 MG/ML
INJECTION, SOLUTION EPIDURAL; INFILTRATION; INTRACAUDAL; PERINEURAL AS NEEDED
Status: DISCONTINUED | OUTPATIENT
Start: 2021-08-03 | End: 2021-08-03 | Stop reason: HOSPADM

## 2021-08-03 RX ORDER — FENTANYL CITRATE 50 UG/ML
INJECTION, SOLUTION INTRAMUSCULAR; INTRAVENOUS AS NEEDED
Status: DISCONTINUED | OUTPATIENT
Start: 2021-08-03 | End: 2021-08-03 | Stop reason: HOSPADM

## 2021-08-03 RX ORDER — MIDAZOLAM HYDROCHLORIDE 1 MG/ML
INJECTION, SOLUTION INTRAMUSCULAR; INTRAVENOUS AS NEEDED
Status: DISCONTINUED | OUTPATIENT
Start: 2021-08-03 | End: 2021-08-03 | Stop reason: HOSPADM

## 2021-08-03 RX ORDER — HEPARIN SODIUM 1000 [USP'U]/ML
INJECTION, SOLUTION INTRAVENOUS; SUBCUTANEOUS AS NEEDED
Status: DISCONTINUED | OUTPATIENT
Start: 2021-08-03 | End: 2021-08-03 | Stop reason: HOSPADM

## 2021-08-03 NOTE — Clinical Note
Contrast Dose Calculator:   Patient's age: 68.   Patient's sex: Female. Patient weight (kg) = 127.9. Creatinine level (mg/dL) = 0.89. Creatinine clearance (mL/min): 109. Max Contrast dose per Creatinine Cl calculator = 245.25 mL.

## 2021-08-03 NOTE — Clinical Note
TRANSFER - OUT REPORT:     Verbal report given to: ARACELI Darling. Report consisted of patient's Situation, Background, Assessment and   Recommendations(SBAR). Opportunity for questions and clarification was provided. Patient transported with a Cardiac Cath Tech / Patient Care Tech. Patient transported to: [address]

## 2021-08-03 NOTE — PROGRESS NOTES
Cath holding summary     Patient escorted to cath holding from waiting area ambulatory, alert and oriented x 4, voicing no complaints. Changed into gown and placed on monitor. NPO since MN. Lab results, med rec and H&P reviewed on chart. PIV x 2 inserted without difficulty. Family to bedside. 1151  TRANSFER - OUT REPORT:    Verbal report given to Reina (name) on Dorothy Sera  being transferred to cath lab(unit) for ordered procedure       Report consisted of patients Situation, Background, Assessment and   Recommendations(SBAR). Information from the following report(s) SBAR, MAR and Pre Procedure Checklist was reviewed with the receiving nurse. Lines:       Opportunity for questions and clarification was provided. Patient transported with:   [address] REPORT:    Verbal report received from [facility] (name) on [facility]  being received from cath lab(unit) for routine post - op      Report consisted of patients Situation, Background, Assessment and   Recommendations(SBAR). Information from the following report(s) SBAR, Procedure Summary and MAR was reviewed with the receiving nurse. Opportunity for questions and clarification was provided. Assessment completed upon patients arrival to unit and care assumed. Dstat to right wrist        1340  Dstat removed from right wrist per protocol. No bleeding or hematoma noted. 0  AVS Discharge instructions reviewed with patient and copy given to patient. All questions answered. Patient verbalized understanding to all discharge instructions. PIV removed. Procedural site within normal limits. No hematoma or bleeding noted from procedural and PIV site. No pain noted at discharge. Patient discharged with support person in stable condition. Escorted out to vehicle for transport home.

## 2021-08-03 NOTE — ROUTINE PROCESS
Patient arrived at 0957 on Cath Holding unit. Patient is alert and oriented times 3. Patient is accompanied today by her daughter Ernie De Anda.

## 2021-08-03 NOTE — DISCHARGE INSTRUCTIONS
DISCHARGE SUMMARY from Nurse    PATIENT INSTRUCTIONS:    After general anesthesia or intravenous sedation, for 24 hours or while taking prescription Narcotics:  · Limit your activities  · Do not drive and operate hazardous machinery  · Do not make important personal or business decisions  · Do  not drink alcoholic beverages  · If you have not urinated within 8 hours after discharge, please contact your surgeon on call. Report the following to your surgeon:  · Excessive pain, swelling, redness or odor of or around the surgical area  · Temperature over 100.5  · Nausea and vomiting lasting longer than 4 hours or if unable to take medications  · Any signs of decreased circulation or nerve impairment to extremity: change in color, persistent  numbness, tingling, coldness or increase pain  · Any questions    What to do at Home:  Recommended activity: Activity as tolerated and no driving for today. If you experience any of the following symptoms bleeding from wrist, please follow up with ER/911. *  Please give a list of your current medications to your Primary Care Provider. *  Please update this list whenever your medications are discontinued, doses are      changed, or new medications (including over-the-counter products) are added. *  Please carry medication information at all times in case of emergency situations. These are general instructions for a healthy lifestyle:    No smoking/ No tobacco products/ Avoid exposure to second hand smoke  Surgeon General's Warning:  Quitting smoking now greatly reduces serious risk to your health.     Obesity, smoking, and sedentary lifestyle greatly increases your risk for illness    A healthy diet, regular physical exercise & weight monitoring are important for maintaining a healthy lifestyle    You may be retaining fluid if you have a history of heart failure or if you experience any of the following symptoms:  Weight gain of 3 pounds or more overnight or 5 pounds in a week, increased swelling in our hands or feet or shortness of breath while lying flat in bed. Please call your doctor as soon as you notice any of these symptoms; do not wait until your next office visit. The discharge information has been reviewed with the patient. The patient verbalized understanding. Discharge medications reviewed with the patient and appropriate educational materials and side effects teaching were provided. HEART CATHETERIZATION/ANGIOGRAPHY DISCHARGE INSTRUCTIONS    1. Check puncture site frequently for swelling or bleeding. If there is any bleeding, lie down and apply pressure over the area with a clean towel or washcloth. Notify your doctor for any redness, swelling, drainage, or oozing from the puncture site. Notify your doctor for any fever or chills. 2. If the extremity becomes cold, numb, or painful go to the Emergency Room. 3. Activity should be limited for the next 48 hours. Climb stairs as little as possible and avoid any stooping, bending, or strenuous activity for 48 hours. No heavy lifting (anything over 8 pounds) for 5 days. 4. You may resume your usual diet. Drink more fluids than usual.  5. Have a responsible person drive you home and stay with you for at least 24 hours after your heart catheterization/angiography. 6. You may remove bandage from your Right Wrist in 24 hours. You may shower in 24 hours. No tub baths, hot tubs, or swimming for 1 week. Do not place any lotions, creams, powders, or ointments over puncture site for 1 week. You may place a clean band-aid over the puncture site each day for 5 days. Change daily. 7. If you take Metformin, do not take it for 48 hours. 8. Ask your nurse when to restart any blood thinners. How can you care for yourself at home? Activity  · Do not do strenuous exercise and do not lift, pull, or push anything heavy until your doctor says it is okay. This may be for a day or two.  You can walk around the house and do light activity, such as cooking. · You may shower 24 to 48 hours after the procedure, if your doctor okays it. Pat the incision dry. Do not take a bath for 1 week, or until your doctor tells you it is okay. · If the catheter was placed in your groin, try not to walk up stairs for the first couple of days. · If the catheter was placed in your arm near your wrist, do not bend your wrist deeply for the first couple of days. Be careful using your hand to get into and out of a chair or bed. · If your doctor recommends it, get more exercise. Walking is a good choice. Bit by bit, increase the amount you walk every day. Try for at least 30 minutes on most days of the week. Diet  · Drink plenty of fluids to help your body flush out the dye. If you have kidney, heart, or liver disease and have to limit fluids, talk with your doctor before you increase the amount of fluids you drink. · Keep eating a heart-healthy diet that has lots of fruits, vegetables, and whole grains. If you have not been eating this way, talk to your doctor. You also may want to talk to a dietitian. This expert can help you to learn about healthy foods and plan meals. Medicines  · Your doctor will tell you if and when you can restart your medicines. He or she will also give you instructions about taking any new medicines. · If you take blood thinners, such as warfarin (Coumadin), clopidogrel (Plavix), or aspirin, be sure to talk to your doctor. He or she will tell you if and when to start taking those medicines again. Make sure that you understand exactly what your doctor wants you to do. · Your doctor may prescribe a blood-thinning medicine like aspirin or clopidogrel (Plavix). It is very important that you take these medicines exactly as directed in order to keep the coronary artery open and reduce your risk of a heart attack. Be safe with medicines. Call your doctor if you think you are having a problem with your medicine.   Care of the catheter site  · For the first 3 days, keep a bandage over the spot where the catheter was inserted. · Put ice or a cold pack on the area for 10 to 20 minutes at a time to help with soreness or swelling. Put a thin cloth between the ice and your skin. Sedation for a Medical Procedure: Care Instructions  Your Care Instructions  For a minor procedure or surgery, you will get a sedative to help you relax. This drug will make you sleepy. It is usually given in a vein (by IV). A shot may also be used to numb the area. If you had local anesthesia, you may feel some pain and discomfort as it wears off. If you have pain, don't be afraid to say so. Pain medicine works better if you take it before the pain gets bad. Common side effects from sedation include:  · Feeling sleepy. (Your doctors and nurses will make sure you are not too sleepy to go home.)  · Nausea and vomiting. This usually does not last long. · Feeling tired. Follow-up care is a key part of your treatment and safety. Be sure to make and go to all appointments, and call your doctor if you are having problems. It's also a good idea to know your test results and keep a list of the medicines you take. How can you care for yourself at home? Activity  · Don't do anything for 24 hours that requires attention to detail. It takes time for the medicine effects to completely wear off. · For your safety, you should not drive or operate any machinery that could be dangerous until the medicine wears off and you can think clearly and react easily. · Rest when you feel tired. Getting enough sleep will help you recover. Diet  · You can eat your normal diet, unless your doctor gives you other instructions. If your stomach is upset, try clear liquids and bland, low-fat foods like plain toast or rice. · Drink plenty of fluids (unless your doctor tells you not to). · Don't drink alcohol for 24 hours. Medicines  · Be safe with medicines.  Read and follow all instructions on the label. ¨ If the doctor gave you a prescription medicine for pain, take it as prescribed. ¨ If you are not taking a prescription pain medicine, ask your doctor if you can take an over-the-counter medicine. · If you think your pain medicine is making you sick to your stomach:  ¨ Take your medicine after meals (unless your doctor has told you not to). ¨ Ask your doctor for a different pain medicine. Follow-up care is a key part of your treatment and safety. Be sure to make and go to all appointments, and call your doctor if you are having problems. It's also a good idea to know your test results and keep a list of the medicines you take. When should you call for help? Call 911 anytime you think you may need emergency care. For example, call if:  · You passed out (lost consciousness). · You have severe trouble breathing. · You have sudden chest pain and shortness of breath, or you cough up blood. · You have symptoms of a heart attack. These may include:  ¨ Chest pain or pressure, or a strange feeling in the chest.  ¨ Sweating. ¨ Shortness of breath. ¨ Nausea or vomiting. ¨ Pain, pressure, or a strange feeling in the back, neck, jaw, or upper belly, or in one or both shoulders or arms. ¨ Lightheadedness or sudden weakness. ¨ A fast or irregular heartbeat. After you call 911, the  may tel you to chew 1 adult-strength or 2 to 4 low-dose aspirin. Wait for an ambulance. Do not try to drive yourself. · You have been diagnosed with angina, and you have symptoms that do not go away with rest or are not getting better within 5 minutes after you take a dose of nitroglycerin. Call your doctor now or seek immediate medical care if:  · You are bleeding from the area where the catheter was put in your artery. · You have a fast-growing, painful lump at the catheter site. · You have signs of infection, such as:  ¨ Increased pain, swelling, warmth, or redness.   ¨ Red streaks leading from the catheter site. ¨ Pus draining from the catheter site. ¨ A fever. · Your leg or arm looks blue or feels cold, numb, or tingly. These are general instructions for a healthy lifestyle:    No smoking/ No tobacco products/ Avoid exposure to second hand smoke    Surgeon General's Warning:  Quitting smoking now greatly reduces serious risk to your health. Obesity, smoking, and sedentary lifestyle greatly increases your risk for illness    A healthy diet, regular physical exercise & weight monitoring are important for maintaining a healthy lifestyle    You may be retaining fluid if you have a history of heart failure or if you experience any of the following symptoms:  Weight gain of 3 pounds or more overnight or 5 pounds in a week, increased swelling in our hands or feet or shortness of breath while lying flat in bed. Please call your doctor as soon as you notice any of these symptoms; do not wait until your next office visit. Recognize signs and symptoms of STROKE:    F-face looks uneven    A-arms unable to move or move unevenly    S-speech slurred or non-existent    T-time-call 911 as soon as signs and symptoms begin-DO NOT go       Back to bed or wait to see if you get better-TIME IS BRAIN. Warning Signs of HEART ATTACK     Call 911 if you have these symptoms:   Chest discomfort. Most heart attacks involve discomfort in the center of the chest that lasts more than a few minutes, or that goes away and comes back. It can feel like uncomfortable pressure, squeezing, fullness, or pain.  Discomfort in other areas of the upper body. Symptoms can include pain or discomfort in one or both arms, the back, neck, jaw, or stomach.  Shortness of breath with or without chest discomfort.  Other signs may include breaking out in a cold sweat, nausea, or lightheadedness. Don't wait more than five minutes to call 911 - MINUTES MATTER! Fast action can save your life.  Calling 911 is almost always the fastest way to get lifesaving treatment.  Emergency Medical Services staff can begin treatment when they arrive -- up to an hour sooner than if someone gets to the hospital by car.               ___________________________________________________________________________________________________________________________________

## 2021-08-03 NOTE — INTERVAL H&P NOTE
Update History & Physical    The Patient's History and Physical of July 29,   Cardiac catheterization/PCI/emergency CABG was reviewed with the patient and I examined the patient. There was no change. The surgical site was confirmed by the patient and me. Plan:  The risk, benefits, expected outcome, and alternative to the recommended procedure have been discussed with the patient. Patient understands and wants to proceed with the procedure.     Electronically signed by Radha Sotomayor MD on 8/3/2021 at 12:17 PM

## 2021-08-03 NOTE — Clinical Note
TRANSFER - IN REPORT:     Verbal report received from: Cory Christie RN. Report consisted of patient's Situation, Background, Assessment and   Recommendations(SBAR). Opportunity for questions and clarification was provided. Assessment completed upon patient's arrival to unit and care assumed. Patient transported with a Cardiac Cath Tech / Patient Care Tech.

## 2021-08-23 ENCOUNTER — TELEPHONE (OUTPATIENT)
Dept: CARDIOLOGY CLINIC | Age: 76
End: 2021-08-23

## 2021-08-23 NOTE — TELEPHONE ENCOUNTER
Spoke with patient per Dr. Dwayne Gaffney, hold Lipitor and see if symptoms improve. She voices understanding and acceptance of this advice and will call back if any further questions or concerns.

## 2021-08-31 ENCOUNTER — OFFICE VISIT (OUTPATIENT)
Dept: CARDIOLOGY CLINIC | Age: 76
End: 2021-08-31
Payer: MEDICARE

## 2021-08-31 VITALS
DIASTOLIC BLOOD PRESSURE: 64 MMHG | HEART RATE: 64 BPM | BODY MASS INDEX: 44.32 KG/M2 | WEIGHT: 283 LBS | SYSTOLIC BLOOD PRESSURE: 135 MMHG

## 2021-08-31 DIAGNOSIS — R07.9 CHEST PAIN, UNSPECIFIED TYPE: ICD-10-CM

## 2021-08-31 DIAGNOSIS — I10 ESSENTIAL HYPERTENSION: Primary | ICD-10-CM

## 2021-08-31 DIAGNOSIS — I34.0 NON-RHEUMATIC MITRAL REGURGITATION: ICD-10-CM

## 2021-08-31 DIAGNOSIS — I50.32 CHRONIC DIASTOLIC HEART FAILURE (HCC): ICD-10-CM

## 2021-08-31 PROCEDURE — G8417 CALC BMI ABV UP PARAM F/U: HCPCS | Performed by: INTERNAL MEDICINE

## 2021-08-31 PROCEDURE — 1090F PRES/ABSN URINE INCON ASSESS: CPT | Performed by: INTERNAL MEDICINE

## 2021-08-31 PROCEDURE — G8427 DOCREV CUR MEDS BY ELIG CLIN: HCPCS | Performed by: INTERNAL MEDICINE

## 2021-08-31 PROCEDURE — 99214 OFFICE O/P EST MOD 30 MIN: CPT | Performed by: INTERNAL MEDICINE

## 2021-08-31 PROCEDURE — G8400 PT W/DXA NO RESULTS DOC: HCPCS | Performed by: INTERNAL MEDICINE

## 2021-08-31 PROCEDURE — 1101F PT FALLS ASSESS-DOCD LE1/YR: CPT | Performed by: INTERNAL MEDICINE

## 2021-08-31 PROCEDURE — G8536 NO DOC ELDER MAL SCRN: HCPCS | Performed by: INTERNAL MEDICINE

## 2021-08-31 PROCEDURE — G8754 DIAS BP LESS 90: HCPCS | Performed by: INTERNAL MEDICINE

## 2021-08-31 PROCEDURE — G8432 DEP SCR NOT DOC, RNG: HCPCS | Performed by: INTERNAL MEDICINE

## 2021-08-31 PROCEDURE — G8752 SYS BP LESS 140: HCPCS | Performed by: INTERNAL MEDICINE

## 2021-08-31 NOTE — PROGRESS NOTES
1. Have you been to the ER, urgent care clinic since your last visit? Hospitalized since your last visit?     no  2. Have you seen or consulted any other health care providers outside of the [address] [place] [address] since your last visit? Include any pap smears or colon screening.       Yes Where: [facility]

## 2021-08-31 NOTE — PROGRESS NOTES
HISTORY OF PRESENT ILLNESS  Otilia Berrios is a 68 y.o. female. 7/2019  Denies any chest pain tightness pressure. Has complaint of weakness in the leg when she stands up and feels numb. Previous MRI from 1/2018 noted. Advised to follow-up with spine doctor again. 12/2019  Patient seen in office today with complaint of left-sided pain. Describes pain in her left shoulder while she is sleeping on the nighttime. Also on movement of her shoulder it feels worse. No other associated symptoms. No exertional pain. 7/2021  Patient seen today for follow-up. Patient with episode of substernal pain more on laying down position worse on shifting position. No clear radiation. She is also short of breath after exercising for about 5 minutes. She has noted increase edema    CHF  The history is provided by the patient. This is a chronic problem. The problem occurs constantly. The problem has not changed since onset. Pertinent negatives include no chest pain, no abdominal pain, no headaches and no shortness of breath. The symptoms are aggravated by exertion. The symptoms are relieved by rest.   Hypertension  The history is provided by the patient. This is a chronic problem. The problem occurs constantly. The problem has not changed since onset. Pertinent negatives include no chest pain, no abdominal pain, no headaches and no shortness of breath. Review of Systems   Constitutional: Negative for chills and fever. HENT: Negative for nosebleeds. Eyes: Negative for blurred vision and double vision. Respiratory: Negative for cough, hemoptysis, sputum production, shortness of breath and wheezing. Cardiovascular: Negative for chest pain, palpitations, orthopnea, claudication, leg swelling and PND. Gastrointestinal: Negative for abdominal pain, heartburn, nausea and vomiting. Musculoskeletal: Negative for myalgias. Skin: Negative for rash. Neurological: Negative for dizziness, weakness and headaches. Endo/Heme/Allergies: Does not bruise/bleed easily. Family History   Problem Relation Age of Onset    Other Mother         congestive heart failure    Hypertension Mother     Stroke Mother     Hypertension Father     Heart Attack Father     Hypertension Brother        Past Medical History:   Diagnosis Date    Chronic diastolic CHF (congestive heart failure) (HCC)     Chronic diastolic heart failure (HCC)     Dysuria     GERD (gastroesophageal reflux disease)     HTN (hypertension)     Hx of colonic polyp     Hypothyroidism     Morbid obesity ([address] 7/9/2013    Proteinuria     Transfusion history     No history of receiving blood or blood product transfusion(s).  Unspecified hypothyroidism     Urinary incontinence     UTI (urinary tract infection)        Past Surgical History:   Procedure Laterality Date    HX BREAST BIOPSY      HX BUNIONECTOMY      left big toe    HX COLONOSCOPY  8/26/11    HX DILATION AND CURETTAGE  2011       Social History     Tobacco Use    Smoking status: Never Smoker    Smokeless tobacco: Never Used   Substance Use Topics    Alcohol use: No     Comment: wine ocassionally lil wine       Allergies   Allergen Reactions    Ciprofloxacin Hives     Rash, nausea    Ciprofloxacin-Dexamethasone Nausea Only       Prior to Admission medications    Medication Sig Start Date End Date Taking? Authorizing Provider   metoprolol succinate (TOPROL-XL) 25 mg XL tablet Take 1 Tablet by mouth daily. 7/29/21  Yes Chaka Madrigal MD   furosemide (LASIX) 20 mg tablet Take 1 Tablet by mouth daily. 7/21/21  Yes Akhil Alston NP   amLODIPine (NORVASC) 5 mg tablet TAKE 1 TABLET EVERY DAY 1/6/21  Yes Akhil Alston NP   acetaminophen (TYLENOL) 650 mg TbER Take 650 mg by mouth as needed for Pain. Yes Provider, Historical   liothyronine (CYTOMEL) 5 mcg tablet Take 5 mcg by mouth daily.    Yes Provider, Historical   diclofenac (Voltaren) 1 % gel Apply  to affected area four (4) times daily. Yes Provider, Historical   aspirin delayed-release 81 mg tablet Take 1 Tab by mouth daily. 1/4/19  Yes Ellie Owens MD   levothyroxine (SYNTHROID) 100 mcg tablet Take 88 mcg by mouth Daily (before breakfast). Yes Provider, Historical   atorvastatin (LIPITOR) 80 mg tablet Take 1 Tablet by mouth daily. 8/2/21   Ellie Owens MD   meloxicam (MOBIC) 15 mg tablet Take 1 Tab by mouth daily. Patient not taking: Reported on 8/3/2021 12/18/19   Tracey Bhatt MD         Visit Vitals  /64   Pulse 64   Wt 128.4 kg (283 lb)   BMI 44.32 kg/m²         Physical Exam  Constitutional:       Appearance: She is well-developed. HENT:      Head: Normocephalic and atraumatic. Eyes:      Conjunctiva/sclera: Conjunctivae normal.   Neck:      Thyroid: No thyromegaly. Vascular: No JVD. Trachea: No tracheal deviation. Cardiovascular:      Rate and Rhythm: Normal rate and regular rhythm. Chest Wall: PMI is not displaced. Pulses: No decreased pulses. Heart sounds: No murmur heard. No gallop. No S3 sounds. Pulmonary:      Effort: No respiratory distress. Breath sounds: No wheezing or rales. Chest:      Chest wall: No tenderness. Abdominal:      Palpations: Abdomen is soft. Tenderness: There is no abdominal tenderness. Musculoskeletal:      Cervical back: Neck supple. Skin:     General: Skin is warm. Neurological:      Mental Status: She is alert and oriented to person, place, and time. Ms. Sebas Fitch has a reminder for a \"due or due soon\" health maintenance. I have asked that she contact her primary care provider for follow-up on this health maintenance. No flowsheet data found. SUMMARY:echo-12/2018  Left ventricle: Systolic function was normal. Ejection fraction was  estimated in the range of 55 % to 60 %. There were no regional wall motion  abnormalities. There was mild concentric hypertrophy.  Doppler parameters  were consistent with abnormal left ventricular relaxation (grade 1  diastolic dysfunction). Right ventricle: The size was at the upper limits of normal.    Mitral valve: There was mild annular calcification. Tricuspid valve: There was mild regurgitation. Pulmonic valve: There was mild regurgitation. NUCLEAR [id number]     Findings:   1. Stress images reveal normal Myoview distrubution in all the LV segments in short axis, vertical and horizontal long axis views. 2. Resting images have a normal uptake. 3. Gated images reveal normal wall motion and the ejection fraction is calculated to be 85%. Conclusion:   1. Normal perfusion scan. 2. Normal wall motion and ejection fraction. 3. No evidence of significant fixed or reversible defect suggesting ischemia or myocardial infarction noted from this nuclear study. 4. Low risk scan. Jama Cyr MD Test Supervisor, ECG [place], SPECT [place] 2021   Procedure Conclusion    Nuclear Stress Test    Nuclear Cardiac Spect Rest then Gated Stress with tomographic imaging/tomography utilized for the tomographic myocardical perfusion imaging performed. Doraine Engman was used as the stressing method and agent. (Doraine Engman given via a 10 - 20 sec injection.)   One day myocardial perfusion study. Date: 2021. Left ventricular perfusion is abnormal. Myocardial perfusion imaging supports a high risk stress test.   There is a prior study available for comparison. . As compared to the previous study, there are significant changes. Perfusion defects appear to be new. The left ventricular ejection fraction has decreased. Segmental wall-motion abnormalities are new. This Single Photon Emission Computer Tomograph (SPECT) study utilized tomographic imaging/tomography for the tomographic myocardial perfusion imaging performed during this study. Interpretation Summary    · SPECT: Left ventricular function post-stress was normal. Calculated ejection fraction is 77%.  The TID ratio is 0.93.  · Baseline ECG: Normal EKG. · SPECT: Myocardial perfusion imaging defect 1: There is a defect that is large in size with a moderate reduction in uptake present in the basal-apical, anteroseptal and lateral location(s) that is reversible. There is normal wall motion in the defect area. Viability in the area is good. The defect appears to be ischemia. · SPECT: Left ventricular perfusion is abnormal. Myocardial perfusion imaging supports a high risk stress test.        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 · Procedure done via right radial artery without any complications. Assessment         ICD-10-CM ICD-9-CM    1. Essential hypertension  I10 401.9     Stable continue treatment   2. Chronic diastolic heart failure (HCC)  I50.32 428.32     Stable compensated class II   3. Non-rheumatic mitral regurgitation  I34.0 424.0     Stable symptom monitor   4. Chest pain, unspecified type  R07.9 786.50     No significant CAD on recent cath continue current treatment and monitoring   7/2018  CHF compensated. Blood pressure is controlled. Not requiring metoprolol. Will continue to monitor with salt restriction and diet  1/2019  Stable cardiac status. Blood pressure is controlled on diet alone. Continue aspirin. Check lipids with PCP  12/2019  Noncardiac left-sided chest pain likely related to shoulder. Blood pressure elevated. Started on amlodipine 5 mg a day  6/2020  Stable cardiac status continue to monitor. I have started HCTZ 25 mg a day. This is mainly for ankle edema and mildly elevated blood pressure. She will continue to monitor and use it as needed. BMP in a few weeks  1/2021  Cardiac status stable. Mildly elevated blood pressure. Currently off HCTZ due to lightheadedness. Continue monitoring.   Patient has continued to have progressive weight loss. Continue with dieting  7/2021  Seen with increased blood pressure increase edema and chest pain. Set up for testing add Lasix 20 mg a day. Follow BMP  2021-07-29  Patient seen today for follow-up. Likely has atypical angina and progressive symptom. Large reversible defect of anterior anteroseptal anterolateral wall. Proceed with cardiac catheterization  Check labs. Add statin add beta-blocker  8/2021  Stable cardiac status. Atypical angina stable. No significant CAD on recent cardiac cath continue treatment  Intolerant to statin. Myalgia with Lipitor. Improved after stopping that. Even though LDL is elevated we will start with dietary modification and hold off on another statin at present. There are no discontinued medications. No orders of the defined types were placed in this encounter. Follow-up and Dispositions    · Return in about 6 months (around 2/28/2022).

## 2021-09-24 ENCOUNTER — OFFICE VISIT (OUTPATIENT)
Dept: ORTHOPEDIC SURGERY | Age: 76
End: 2021-09-24
Payer: MEDICARE

## 2021-09-24 VITALS
OXYGEN SATURATION: 99 % | RESPIRATION RATE: 16 BRPM | HEART RATE: 61 BPM | HEIGHT: 67 IN | WEIGHT: 280 LBS | TEMPERATURE: 97.5 F | BODY MASS INDEX: 43.95 KG/M2

## 2021-09-24 DIAGNOSIS — M17.11 PRIMARY OSTEOARTHRITIS OF RIGHT KNEE: Primary | ICD-10-CM

## 2021-09-24 DIAGNOSIS — M17.12 PRIMARY OSTEOARTHRITIS OF LEFT KNEE: ICD-10-CM

## 2021-09-24 PROCEDURE — 1101F PT FALLS ASSESS-DOCD LE1/YR: CPT | Performed by: ORTHOPAEDIC SURGERY

## 2021-09-24 PROCEDURE — 20610 DRAIN/INJ JOINT/BURSA W/O US: CPT | Performed by: ORTHOPAEDIC SURGERY

## 2021-09-24 PROCEDURE — 99213 OFFICE O/P EST LOW 20 MIN: CPT | Performed by: ORTHOPAEDIC SURGERY

## 2021-09-24 PROCEDURE — G8432 DEP SCR NOT DOC, RNG: HCPCS | Performed by: ORTHOPAEDIC SURGERY

## 2021-09-24 PROCEDURE — G8536 NO DOC ELDER MAL SCRN: HCPCS | Performed by: ORTHOPAEDIC SURGERY

## 2021-09-24 PROCEDURE — G8756 NO BP MEASURE DOC: HCPCS | Performed by: ORTHOPAEDIC SURGERY

## 2021-09-24 PROCEDURE — 1090F PRES/ABSN URINE INCON ASSESS: CPT | Performed by: ORTHOPAEDIC SURGERY

## 2021-09-24 PROCEDURE — G8427 DOCREV CUR MEDS BY ELIG CLIN: HCPCS | Performed by: ORTHOPAEDIC SURGERY

## 2021-09-24 PROCEDURE — G8400 PT W/DXA NO RESULTS DOC: HCPCS | Performed by: ORTHOPAEDIC SURGERY

## 2021-09-24 PROCEDURE — G8417 CALC BMI ABV UP PARAM F/U: HCPCS | Performed by: ORTHOPAEDIC SURGERY

## 2021-10-01 ENCOUNTER — OFFICE VISIT (OUTPATIENT)
Dept: ORTHOPEDIC SURGERY | Age: 76
End: 2021-10-01
Payer: MEDICARE

## 2021-10-01 VITALS
HEIGHT: 67 IN | OXYGEN SATURATION: 99 % | WEIGHT: 282 LBS | HEART RATE: 67 BPM | TEMPERATURE: 97.5 F | BODY MASS INDEX: 44.26 KG/M2

## 2021-10-01 DIAGNOSIS — M17.11 PRIMARY OSTEOARTHRITIS OF RIGHT KNEE: Primary | ICD-10-CM

## 2021-10-01 DIAGNOSIS — M17.12 PRIMARY OSTEOARTHRITIS OF LEFT KNEE: ICD-10-CM

## 2021-10-01 PROCEDURE — 20610 DRAIN/INJ JOINT/BURSA W/O US: CPT | Performed by: ORTHOPAEDIC SURGERY

## 2021-10-01 NOTE — PROGRESS NOTES
Patient: Emilia Forbes                MRN: [id number]       SSN: [id number]  YOB: 1945        AGE: 68 y.o. SEX: female  Body mass index is 44.17 kg/m². PCP: Cyndy Dixon MD  10/01/21    Chief Complaint: Bilateral knee pain 5/10    HPI: Emilia Forbes is a 68 y.o. female patient who returns to the office today for her bilateral knee pain. She is here today for Orthovisc injection #2 into both knees. She says she has not noticed much relief since the first shot. Past Medical History:   Diagnosis Date    Chronic diastolic CHF (congestive heart failure) (HCC)     Chronic diastolic heart failure (HCC)     Dysuria     GERD (gastroesophageal reflux disease)     HTN (hypertension)     Hx of colonic polyp     Hypothyroidism     Morbid obesity ([facility] [address] 7/9/2013    Proteinuria     Transfusion history     No history of receiving blood or blood product transfusion(s).  Unspecified hypothyroidism     Urinary incontinence     UTI (urinary tract infection)        Family History   Problem Relation Age of Onset    Other Mother         congestive heart failure    Hypertension Mother     Stroke Mother     Hypertension Father     Heart Attack Father     Hypertension Brother        Current Outpatient Medications   Medication Sig Dispense Refill    amLODIPine (NORVASC) 5 mg tablet TAKE 1 TABLET EVERY DAY 90 Tablet 3    metoprolol succinate (TOPROL-XL) 25 mg XL tablet Take 1 Tablet by mouth daily. 90 Tablet 3    furosemide (LASIX) 20 mg tablet Take 1 Tablet by mouth daily. 30 Tablet 0    acetaminophen (TYLENOL) 650 mg TbER Take 650 mg by mouth as needed for Pain.  liothyronine (CYTOMEL) 5 mcg tablet Take 5 mcg by mouth daily.  diclofenac (Voltaren) 1 % gel Apply  to affected area four (4) times daily.  aspirin delayed-release 81 mg tablet Take 1 Tab by mouth daily. 100 Tab 1    levothyroxine (SYNTHROID) 100 mcg tablet Take 88 mcg by mouth Daily (before breakfast). Current Facility-Administered Medications   Medication Dose Route Frequency Provider Last Rate Last Admin    sodium hyaluronate (viscosup) (ORTHOVISC) 30 mg/2 mL injection syrg 30 mg  30 mg Intra artICUlar ONCE Marni Vargas MD           Allergies   Allergen Reactions    Ciprofloxacin Hives     Rash, nausea    Ciprofloxacin-Dexamethasone Nausea Only       Past Surgical History:   Procedure Laterality Date    HX BREAST BIOPSY      HX BUNIONECTOMY      left big toe    HX COLONOSCOPY  8/26/11    HX DILATION AND CURETTAGE  2011       Social History     Socioeconomic History    Marital status:      Spouse name: Not on file    Number of children: Not on file    Years of education: Not on file    Highest education level: Not on file   Occupational History    Not on file   Tobacco Use    Smoking status: Never Smoker    Smokeless tobacco: Never Used   Substance and Sexual Activity    Alcohol use: No     Comment: wine ocassionally lil wine    Drug use: No    Sexual activity: Not on file   Other Topics Concern    Not on file   Social History Narrative    Not on file     Social Determinants of Health     Financial Resource Strain:     Difficulty of Paying Living Expenses:    Food Insecurity:     Worried About Running Out of Food in the Last Year:     Natan of Food in the Last Year:    Transportation Needs:     Lack of Transportation (Medical):      Lack of Transportation (Non-Medical):    Physical Activity:     Days of Exercise per Week:     Minutes of Exercise per Session:    Stress:     Feeling of Stress :    Social Connections:     Frequency of Communication with Friends and Family:     Frequency of Social Gatherings with Friends and Family:     Attends [employer]ist Services:     Active Member of Clubs or Organizations:     Attends Club or Organization Meetings:     Marital Status:    Intimate Partner Violence:     Fear of Current or Ex-Partner:     Emotionally Abused:     Physically Abused:     Sexually Abused:        REVIEW OF SYSTEMS:      No changes from previous review of systems unless noted. PHYSICAL EXAMINATION:  Visit Vitals  Pulse 67   Temp 97.5 °F (36.4 °C) (Temporal)   Ht 5' 7\" (1.702 m)   Wt 282 lb (127.9 kg)   SpO2 99%   BMI 44.17 kg/m²     Body mass index is 44.17 kg/m². GENERAL: Alert and oriented x3, in no acute distress. HEENT: Normocephalic, atraumatic. RESP: Non labored breathing. SKIN: No rashes or lesions noted. Knee Examination      R   L  Effusion   -   -  Warmth   -   -  Erythema   -   -  ROM   Extension  Full   Full   Flexion   Full   Full  Tenderness   Medial Joint  +   +   Lateral Joint  +   +   Posterior knee  -   -  Strength   Quad   5   5   Hamstring  5   5  Crepitus   Tibiofemoral   -   -   Patellofemoral  -   -  Instability   Anterior  -   -   Posterior  -   -   Patellofemoral  -   -  Lachman's   -   -  Anterior Drawer  -   -  Posterior Drawer  -   -  Mariella's   Pain   -   -   Locking  -   -  Calf TTP   -   -  Straight Leg Raise  -   -      IMAGING:  No imaging today    ASSESSMENT & PLAN  Diagnosis: Bilateral knee osteoarthritis    Otilia is here today for bilateral knee Orthovisc injection #2. She tolerated this well. Follow-up in 1 week.     [facility]  OFFICE PROCEDURE PROGRESS NOTE        Chart reviewed for the following:   Yon Stokes MD, have reviewed the History, Physical and updated the Allergic reactions for Otilia [address] [place] [address] performed immediately prior to start of procedure:   Yon Stokes MD, have performed the following reviews on Otilia Watts prior to the start of the procedure:            * Patient was identified by name and date of birth   * Agreement on procedure being performed was verified  * Risks and Benefits explained to the patient  * Procedure site verified and marked as necessary  * Patient was positioned for comfort  * Consent was signed and verified    Time: 10:40 AM    Location: Bilateral knee joint intra-articular injection    Orthovisc 30mg (2cc)    Date of procedure: 10/1/2021    Procedure performed by:  Annette Muhammad MD    Provider assisted by: Rebecca Chapman LPN    Patient assisted by: self    How tolerated by patient: tolerated the procedure well with no complications    Post Procedural Pain Scale: 0 - No Hurt    Comments: none        Electronically signed by: Annette Muhammad MD    Note: This note was completed using voice recognition software.   Any typographical/name errors or mistakes are unintentional.

## 2021-10-13 ENCOUNTER — OFFICE VISIT (OUTPATIENT)
Dept: ORTHOPEDIC SURGERY | Age: 76
End: 2021-10-13
Payer: MEDICARE

## 2021-10-13 VITALS
WEIGHT: 282 LBS | TEMPERATURE: 96.8 F | BODY MASS INDEX: 44.26 KG/M2 | HEIGHT: 67 IN | HEART RATE: 67 BPM | OXYGEN SATURATION: 98 %

## 2021-10-13 DIAGNOSIS — M17.12 PRIMARY OSTEOARTHRITIS OF LEFT KNEE: ICD-10-CM

## 2021-10-13 DIAGNOSIS — M17.11 PRIMARY OSTEOARTHRITIS OF RIGHT KNEE: Primary | ICD-10-CM

## 2021-10-13 PROCEDURE — 20610 DRAIN/INJ JOINT/BURSA W/O US: CPT | Performed by: ORTHOPAEDIC SURGERY

## 2021-10-13 NOTE — PROGRESS NOTES
Patient: Renetta Rodriguez                MRN: [id number]       SSN: [id number]  YOB: 1945        AGE: 68 y.o. SEX: female  Body mass index is 44.17 kg/m². PCP: Torie Bauman MD  10/13/21    Chief Complaint: Bilateral knee pain 6/10    HPI: Renetta Rodriguez is a 68 y.o. female patient who returns to the office today for Orthovisc injection #2 into both knees. She tolerated the first well but did not notice much in the way of pain relief. Past Medical History:   Diagnosis Date    Bilateral knee pain     Chronic diastolic CHF (congestive heart failure) (HCC)     Chronic diastolic heart failure (HCC)     Dysuria     GERD (gastroesophageal reflux disease)     HTN (hypertension)     Hx of colonic polyp     Hypothyroidism     Morbid obesity ([address] 7/9/2013    Proteinuria     Transfusion history     No history of receiving blood or blood product transfusion(s).  Unspecified hypothyroidism     Urinary incontinence     UTI (urinary tract infection)        Family History   Problem Relation Age of Onset    Other Mother         congestive heart failure    Hypertension Mother     Stroke Mother     Hypertension Father     Heart Attack Father     Hypertension Brother        Current Outpatient Medications   Medication Sig Dispense Refill    amLODIPine (NORVASC) 5 mg tablet TAKE 1 TABLET EVERY DAY 90 Tablet 3    metoprolol succinate (TOPROL-XL) 25 mg XL tablet Take 1 Tablet by mouth daily. 90 Tablet 3    furosemide (LASIX) 20 mg tablet Take 1 Tablet by mouth daily. 30 Tablet 0    acetaminophen (TYLENOL) 650 mg TbER Take 650 mg by mouth as needed for Pain.  liothyronine (CYTOMEL) 5 mcg tablet Take 5 mcg by mouth daily.  diclofenac (Voltaren) 1 % gel Apply  to affected area four (4) times daily.  aspirin delayed-release 81 mg tablet Take 1 Tab by mouth daily. 100 Tab 1    levothyroxine (SYNTHROID) 100 mcg tablet Take 88 mcg by mouth Daily (before breakfast). Current Facility-Administered Medications   Medication Dose Route Frequency Provider Last Rate Last Admin    sodium hyaluronate (viscosup) (ORTHOVISC) 30 mg/2 mL injection syrg 30 mg  30 mg Intra artICUlar ONCE Js Vargas MD           Allergies   Allergen Reactions    Ciprofloxacin Hives     Rash, nausea    Ciprofloxacin-Dexamethasone Nausea Only       Past Surgical History:   Procedure Laterality Date    HX BREAST BIOPSY      HX BUNIONECTOMY      left big toe    HX COLONOSCOPY  8/26/11    HX DILATION AND CURETTAGE  2011       Social History     Socioeconomic History    Marital status:      Spouse name: Not on file    Number of children: Not on file    Years of education: Not on file    Highest education level: Not on file   Occupational History    Not on file   Tobacco Use    Smoking status: Never Smoker    Smokeless tobacco: Never Used   Substance and Sexual Activity    Alcohol use: No     Comment: wine ocassionally lil wine    Drug use: No    Sexual activity: Not on file   Other Topics Concern    Not on file   Social History Narrative    Not on file     Social Determinants of Health     Financial Resource Strain:     Difficulty of Paying Living Expenses:    Food Insecurity:     Worried About Running Out of Food in the Last Year:     Natan of Food in the Last Year:    Transportation Needs:     Lack of Transportation (Medical):      Lack of Transportation (Non-Medical):    Physical Activity:     Days of Exercise per Week:     Minutes of Exercise per Session:    Stress:     Feeling of Stress :    Social Connections:     Frequency of Communication with Friends and Family:     Frequency of Social Gatherings with Friends and Family:     Attends Episcopal Services:     Active Member of Clubs or Organizations:     Attends Club or Organization Meetings:     Marital Status:    Intimate Partner Violence:     Fear of Current or Ex-Partner:     Emotionally Abused:     Physically Abused:     Sexually Abused:        REVIEW OF SYSTEMS:      No changes from previous review of systems unless noted. PHYSICAL EXAMINATION:  Visit Vitals  Pulse 67   Temp 96.8 °F (36 °C)   Ht 5' 7\" (1.702 m)   Wt 282 lb (127.9 kg)   SpO2 98%   BMI 44.17 kg/m²     Body mass index is 44.17 kg/m². GENERAL: Alert and oriented x3, in no acute distress. HEENT: Normocephalic, atraumatic. RESP: Non labored breathing. SKIN: No rashes or lesions noted. Knee Examination      R   L  Effusion   -   -  Warmth   -   -  Erythema   -   -  ROM   Extension  Full   Full   Flexion   Full   Full  Tenderness   Medial Joint  +   +   Lateral Joint  +   +   Posterior knee  -   -  Strength   Quad   5   5   Hamstring  5   5  Crepitus   Tibiofemoral   +   +   Patellofemoral  -   -  Instability   Anterior  -   -   Posterior  -   -   Patellofemoral  -   -  Lachman's   -   -  Anterior Drawer  -   -  Posterior Drawer  -   -  Mariella's   Pain   -   -   Locking  -   -  Calf TTP   -   -  Straight Leg Raise  -   -      IMAGING:  No imaging today    ASSESSMENT & PLAN  Diagnosis: Bilateral knee osteoarthritis    Otilia is here today for her bilateral knee pain. Orthovisc injection #2 was given into both knees without complication. She tolerated this well. Aftercare was discussed. Follow-up in a week.     [facility]  OFFICE PROCEDURE PROGRESS NOTE        Chart reviewed for the following:   I, Momo Bowling MD, have reviewed the History, Physical and updated the Allergic reactions for Otilia [address] [place] [address] performed immediately prior to start of procedure:   Nilay Rodriguez MD, have performed the following reviews on Otilia Hammondie Side prior to the start of the procedure:            * Patient was identified by name and date of birth   * Agreement on procedure being performed was verified  * Risks and Benefits explained to the patient  * Procedure site verified and marked as necessary  * Patient was positioned for comfort  * Consent was signed and verified    Time: 10:53 AM    Location: Bilateral knee joint intra-articular injections    Orthovisc 30mg (2cc)    Date of procedure: 10/13/2021    Procedure performed by:  Jaqueline Cardozo MD    Provider assisted by: Lucero Garcia LPN    Patient assisted by: self    How tolerated by patient: tolerated the procedure well with no complications    Post Procedural Pain Scale: 0 - No Hurt    Comments: none        Electronically signed by: Jaqueline Cardozo MD    Note: This note was completed using voice recognition software.   Any typographical/name errors or mistakes are unintentional.

## 2021-10-18 ENCOUNTER — OFFICE VISIT (OUTPATIENT)
Dept: ORTHOPEDIC SURGERY | Age: 76
End: 2021-10-18
Payer: MEDICARE

## 2021-10-18 VITALS — TEMPERATURE: 97.8 F

## 2021-10-18 DIAGNOSIS — M25.572 CHRONIC PAIN OF LEFT ANKLE: ICD-10-CM

## 2021-10-18 DIAGNOSIS — M67.88 ACHILLES TENDINOSIS: Primary | ICD-10-CM

## 2021-10-18 DIAGNOSIS — G89.29 CHRONIC PAIN OF LEFT ANKLE: ICD-10-CM

## 2021-10-18 DIAGNOSIS — M79.672 LEFT FOOT PAIN: ICD-10-CM

## 2021-10-18 PROCEDURE — 99214 OFFICE O/P EST MOD 30 MIN: CPT | Performed by: ORTHOPAEDIC SURGERY

## 2021-10-18 PROCEDURE — G8427 DOCREV CUR MEDS BY ELIG CLIN: HCPCS | Performed by: ORTHOPAEDIC SURGERY

## 2021-10-18 PROCEDURE — G8417 CALC BMI ABV UP PARAM F/U: HCPCS | Performed by: ORTHOPAEDIC SURGERY

## 2021-10-18 PROCEDURE — G8432 DEP SCR NOT DOC, RNG: HCPCS | Performed by: ORTHOPAEDIC SURGERY

## 2021-10-18 PROCEDURE — 73610 X-RAY EXAM OF ANKLE: CPT | Performed by: ORTHOPAEDIC SURGERY

## 2021-10-18 PROCEDURE — G8536 NO DOC ELDER MAL SCRN: HCPCS | Performed by: ORTHOPAEDIC SURGERY

## 2021-10-18 PROCEDURE — G8756 NO BP MEASURE DOC: HCPCS | Performed by: ORTHOPAEDIC SURGERY

## 2021-10-18 PROCEDURE — 1090F PRES/ABSN URINE INCON ASSESS: CPT | Performed by: ORTHOPAEDIC SURGERY

## 2021-10-18 PROCEDURE — G8400 PT W/DXA NO RESULTS DOC: HCPCS | Performed by: ORTHOPAEDIC SURGERY

## 2021-10-18 PROCEDURE — 1101F PT FALLS ASSESS-DOCD LE1/YR: CPT | Performed by: ORTHOPAEDIC SURGERY

## 2021-10-18 NOTE — PROGRESS NOTES
AMBULATORY PROGRESS NOTE      Patient: Miranda Rodney             MRN: [id number]     SSN: [id number] There is no height or weight on file to calculate BMI. YOB: 1945     AGE: 68 y.o. EX: female    PCP: Batool Costello MD       IMPRESSION //  DIAGNOSIS AND TREATMENT PLAN        Otilia Jara has a diagnosis of: In listening to her carefully, she provides me a history such that she has had pain discomfort for over the last 2 or 3 years now. She is seen at least 2 foot and ankle providers, one at [facility] foot podiatric specialist, and one at [facility], Dr. Poncho Santiago local podiatrist as well. Her conservative care, has consisted of CAM Walker boots, active modification, anti-inflammatories, custom orthotics, as well as supervised formal physical therapy. She still has disabling pain to the left hindfoot, long Achilles tendon, despite conservative measures. I spoke to her about surgical intervention but I would like to get an MRI to quantify her pathology to the left, Achilles tendon. As such MRI is necessary to assess her left Achilles tendon, as she is failed extensive conservative measures. Clinically she has tenderness to left Achilles tendon, fusiform swelling the distal portion of Achilles tendon. X-rays today, 3 views, left ankle: Today:AP lateral and oblique x-rays: X-rays, show generalized osteopenia, large bone spur at Achilles and insertion point, small calcium deposit within the fabric of the distal Achilles tendon, no acute fracture subluxation dislocations, noted. DIAGNOSES    1. Achilles tendinosis    2. Left foot pain    3. Chronic pain of left ankle        Orders Placed This Encounter    [[id number]] Ankle Min 3V     Order Specific Question:   Weight bearing? Answer:   No    MRI ANKLE LT WO CONT     Standing Status:   Future     Standing Expiration Date:   11/18/2021     Order Specific Question:   Arthrogram study     Answer:    No PLAN:    1. Obtain 3-View XR of left foot  2. Order MRI of left ankle    RTO-  F/U of MRI    Otilia Liu  expresses understanding of the diagnosis, treatment plan, and all of their proposed questions were answered to their satisfaction. Patient education has been provided re the diagnoses. HPI //  OBJECTIVE EXAMINATION        Otilia Liu IS A 68 y.o. female who is a/an  new patient, presenting to my outpatient office for evaluation of  the following chief complaint(s):     Chief Complaint   Patient presents with    Foot Pain     left       Dedrick Collet presents today w/ left heel pain onset 2-3 years ago. She has seen 2 doctors for her left heel pain. She mentions she got an injection into her left foot, which provided relief. She has tried CAM walker boot and different shoes to no avail. Visit Vitals  Temp 97.8 °F (36.6 °C)       Appearance: Alert, well appearing and pleasant patient who is in no distress, oriented to person, place/time, and who follows commands. This patient is accompanied in the examination room by her  self. There is signs of: no dementia  Psychiatric: Affect/mood are appropriate. Speech normal in context and clarity, memory intact grossly, no involuntary movements - tremors. Patient arrives to office via: without assistive device:   H EENT (2): Head normocephalic & atraumatic. Eye: pupils are round// EOM are intact // Neck: ROM WNL  // Hearings Intact   Respiratory: Breathing non labored     ANKLE/FOOT left    Gait: antalgic and slow  Tenderness: moderate    Achilles tendon  Cutaneous: swelling along achilles tendon  Joint Motion: Ankle and hindfoot joints have range of motion that are: WNL  Joint / Tendon Stability: No Ankle or Subtalar instability or joint laxity.                        No peroneal sublux ability or dislocation  Alignment: neutral Hindfoot,    Neuro Motor/Sensory: NL/NL  Vascular: NL foot/ankle pulses,   Lymphatics: No extremity lymphedema, No calf swelling, no tenderness to calf muscles. CHART REVIEW     Otilia Carrera has been experiencing pain and discomfort confirmed as outlined in the pain assessment outlined below.  was reviewed by Shavonne Thomas MD on 10/18/2021. Pain Assessment  10/18/2021   Location of Pain Foot   Location Modifiers Left   Severity of Pain 8   Quality of Pain Dull   Duration of Pain -   Frequency of Pain Constant   Aggravating Factors Other (Comment)   Aggravating Factors Comment -   Limiting Behavior Some   Relieving Factors Rest   Relieving Factors Comment -   Result of Injury -        Otilia Carrera  has a past medical history of Bilateral knee pain, Chronic diastolic CHF (congestive heart failure) (HCC), Chronic diastolic heart failure (HCC), Dysuria, GERD (gastroesophageal reflux disease), HTN (hypertension), colonic polyp, Hypothyroidism, Morbid obesity ([facility] [address] (7/9/2013), Proteinuria, Transfusion history, Unspecified hypothyroidism, Urinary incontinence, and UTI (urinary tract infection). Patients is employed at:         Past Medical History:   Diagnosis Date    Bilateral knee pain     Chronic diastolic CHF (congestive heart failure) (HCC)     Chronic diastolic heart failure (HCC)     Dysuria     GERD (gastroesophageal reflux disease)     HTN (hypertension)     Hx of colonic polyp     Hypothyroidism     Morbid obesity ([facility] [address] 7/9/2013    Proteinuria     Transfusion history     No history of receiving blood or blood product transfusion(s).  Unspecified hypothyroidism     Urinary incontinence     UTI (urinary tract infection)      Past Surgical History:   Procedure Laterality Date    HX BREAST BIOPSY      HX BUNIONECTOMY      left big toe    HX COLONOSCOPY  8/26/11    HX DILATION AND CURETTAGE  2011     Current Outpatient Medications   Medication Sig    amLODIPine (NORVASC) 5 mg tablet TAKE 1 TABLET EVERY DAY    metoprolol succinate (TOPROL-XL) 25 mg XL tablet Take 1 Tablet by mouth daily.     furosemide (LASIX) 20 mg tablet Take 1 Tablet by mouth daily.  acetaminophen (TYLENOL) 650 mg TbER Take 650 mg by mouth as needed for Pain.  liothyronine (CYTOMEL) 5 mcg tablet Take 5 mcg by mouth daily.  diclofenac (Voltaren) 1 % gel Apply  to affected area four (4) times daily.  aspirin delayed-release 81 mg tablet Take 1 Tab by mouth daily.  levothyroxine (SYNTHROID) 100 mcg tablet Take 88 mcg by mouth Daily (before breakfast). No current facility-administered medications for this visit. Allergies   Allergen Reactions    Ciprofloxacin Hives     Rash, nausea    Ciprofloxacin-Dexamethasone Nausea Only     Social History     Occupational History    Not on file   Tobacco Use    Smoking status: Never Smoker    Smokeless tobacco: Never Used   Substance and Sexual Activity    Alcohol use: No     Comment: wine ocassionally lil wine    Drug use: No    Sexual activity: Not on file     Family History   Problem Relation Age of Onset    Other Mother         congestive heart failure    Hypertension Mother     Stroke Mother     Hypertension Father     Heart Attack Father     Hypertension Brother         DIAGNOSTIC LAB DATA      No results found for: HBA1C, KCQ8HKKU, DEH7SFSP //   Lab Results   Component Value Date/Time    Glucose 89 07/30/2021 10:56 AM        No results found for: NZX0SLCX, SRK3BCWG      No results found for: VITD3, XQVID2, XQVID3, XQVID, VD3RIA, [id number]     Drug Screen Most Recent Result Date    No resulted procedures found. REVIEW OF SYSTEMS : 10/18/2021  ALL BELOW ARE Negative except : SEE HPI     All other systems reviewed and are negative. 12 point review of systems otherwise negative unless noted in HPI. DIAGNOSTIC IMAGING /ORDERS       Orders Placed This Encounter    [[id number]] Ankle Min 3V     Order Specific Question:   Weight bearing?      Answer:   No    MRI ANKLE LT WO CONT     Standing Status:   Future     Standing Expiration Date:   11/18/2021 Order Specific Question:   Arthrogram study     Answer:   No        ANKLE X RAYS 3 VIEWS RIGHT  X RAYS AT [address] [place] [address]  10/18/2021    FINDINGS:     SOFT TISSUES:              Absent soft tissue swelling, No soft tissue calcifications, No Calcified blood vessels     Soft tissue swelling location:    None to fibula region        None medial aspect    None dorsal midfoot/forefoot  No radiopaque foreign body, abnormal lucency, or focal swelling noted within soft tissues. OSSEOUS:     No fractures, subluxations, dislocations   Bone spur to inferior aspect of calcaneus is  mild   Bone spur to superior calcaneus region is           large SIZED CALCIFIC INSERTIONAL BONE SPUR IS PRESENT   Mineralization: suggests   Osteopenia      ALIGNMENT:    Ankle mortise alignment is congruent. Tibial plafond and talar dome intact      No Osteochondral defects seen    No Ankle joint effusion seen         I have personally reviewed these images of the above study. The interpretation of this study is my professional opinion. Peyton Lantigua MD  10/18/2021  11:14 AM          I have reviewed the results of the above study. The interpretation of this study is my professional opinion. On this date 10/18/2021 I have spent 30 minutes reviewing previous notes, test results and face to face with the patient discussing the diagnosis and importance of compliance with the treatment plan as well as documenting on the day of the visit. An electronic signature was used to authenticate this note. Disclaimer: Sections of this note are dictated using utilizing voice recognition software, which may have resulted in some phonetic based errors in grammar and contents. Even though attempts were made to correct all the mistakes, some may have been missed, and remained in the body of the document. If questions arise, please contact our department.        Marisabel Ortega may have a reminder for a \"due or due soon\" health maintenance. I have asked that she contact her primary care provider for follow-up on this health maintenance. Alona Woodson,as dictated by, Steffen Cho.   10/18/2021  7:59 AM

## 2021-10-18 NOTE — PROGRESS NOTES
Patient: Bridget Murry                MRN: [id number]       SSN: [id number]  YOB: 1945        AGE: 68 y.o. SEX: female  Body mass index is 44.1 kg/m². PCP: Mario Siu MD  10/20/21    Chief Complaint   Patient presents with    Knee Pain     niru knee pain       HISTORY:  Bridget Murry is a 68 y.o. female who is seen for reevaluation of Bilateral knee pain here for 4th and final injection of Orthovisc. PROCEDURE:  Patient's Bilateral intraarticular knee, after timeout under sterile conditions, was injected with 2 cc of Orthovisc. [facility]  OFFICE PROCEDURE PROGRESS NOTE        Chart reviewed for the following:   Dora COOK PA-C, have reviewed the History, Physical and updated the Allergic reactions for [address] performed immediately prior to start of procedure:   Dora COOK PA-C, have performed the following reviews on Otilia Stewart prior to the start of the procedure:            * Patient was identified by name and date of birth   * Agreement on procedure being performed was verified  * Risks and Benefits explained to the patient  * Procedure site verified and marked as necessary  * Patient was positioned for comfort  * Consent was signed and verified     Time: 9:30 am      Date of procedure: 10/20/2021    Procedure performed by:  MERLYN Tamayo    Provider assisted by: None     How tolerated by patient: tolerated the procedure well with no complications    Comments: none    IMPRESSION:     ICD-10-CM ICD-9-CM    1. Primary osteoarthritis of right knee  M17.11 715.16 sodium hyaluronate (viscosup) (ORTHOVISC) 30 mg/2 mL injection syrg 30 mg      DRAIN/INJECT LARGE JOINT/BURSA   2.  Primary osteoarthritis of left knee  M17.12 715.16 sodium hyaluronate (viscosup) (ORTHOVISC) 30 mg/2 mL injection syrg 30 mg      DRAIN/INJECT LARGE JOINT/BURSA        PLAN: Ms. Isha Stewart has completed her Orthovisc injection series. she will return as needed.       Tamanna Limon, [id number]

## 2021-10-19 DIAGNOSIS — M67.88 ACHILLES TENDINOSIS: ICD-10-CM

## 2021-10-19 DIAGNOSIS — M25.572 CHRONIC PAIN OF LEFT ANKLE: ICD-10-CM

## 2021-10-19 DIAGNOSIS — G89.29 CHRONIC PAIN OF LEFT ANKLE: ICD-10-CM

## 2021-10-20 ENCOUNTER — OFFICE VISIT (OUTPATIENT)
Dept: ORTHOPEDIC SURGERY | Age: 76
End: 2021-10-20
Payer: MEDICARE

## 2021-10-20 VITALS
BODY MASS INDEX: 44.2 KG/M2 | TEMPERATURE: 96.8 F | HEART RATE: 58 BPM | OXYGEN SATURATION: 100 % | WEIGHT: 281.6 LBS | HEIGHT: 67 IN | RESPIRATION RATE: 16 BRPM

## 2021-10-20 DIAGNOSIS — M17.11 PRIMARY OSTEOARTHRITIS OF RIGHT KNEE: Primary | ICD-10-CM

## 2021-10-20 DIAGNOSIS — M17.12 PRIMARY OSTEOARTHRITIS OF LEFT KNEE: ICD-10-CM

## 2021-10-20 PROCEDURE — 20610 DRAIN/INJ JOINT/BURSA W/O US: CPT | Performed by: ORTHOPAEDIC SURGERY

## 2021-10-25 ENCOUNTER — TELEPHONE (OUTPATIENT)
Dept: ORTHOPEDIC SURGERY | Age: 76
End: 2021-10-25

## 2021-10-25 NOTE — TELEPHONE ENCOUNTER
Patient would like the order for the left ankle MRI to be sent to [facility] on [facility] [address] in [place]. The copay through [employer] is too expensive. Please advise once sent.      Patient [phone]

## 2021-10-25 NOTE — TELEPHONE ENCOUNTER
Patients order, demographics and last office note were faxed to [facility] requesting appt.    Tel# [phone] fax# [phone]

## 2022-02-01 ENCOUNTER — OFFICE VISIT (OUTPATIENT)
Dept: CARDIOLOGY CLINIC | Age: 77
End: 2022-02-01
Payer: MEDICARE

## 2022-02-01 VITALS
SYSTOLIC BLOOD PRESSURE: 133 MMHG | BODY MASS INDEX: 43.79 KG/M2 | WEIGHT: 279 LBS | HEART RATE: 61 BPM | DIASTOLIC BLOOD PRESSURE: 59 MMHG | HEIGHT: 67 IN

## 2022-02-01 DIAGNOSIS — E66.01 MORBID OBESITY (HCC): ICD-10-CM

## 2022-02-01 DIAGNOSIS — I10 ESSENTIAL HYPERTENSION: ICD-10-CM

## 2022-02-01 DIAGNOSIS — E03.9 ACQUIRED HYPOTHYROIDISM: ICD-10-CM

## 2022-02-01 DIAGNOSIS — I34.0 NON-RHEUMATIC MITRAL REGURGITATION: ICD-10-CM

## 2022-02-01 DIAGNOSIS — I50.32 CHRONIC DIASTOLIC HEART FAILURE (HCC): Primary | ICD-10-CM

## 2022-02-01 PROCEDURE — G8536 NO DOC ELDER MAL SCRN: HCPCS | Performed by: INTERNAL MEDICINE

## 2022-02-01 PROCEDURE — G8752 SYS BP LESS 140: HCPCS | Performed by: INTERNAL MEDICINE

## 2022-02-01 PROCEDURE — G8427 DOCREV CUR MEDS BY ELIG CLIN: HCPCS | Performed by: INTERNAL MEDICINE

## 2022-02-01 PROCEDURE — 99214 OFFICE O/P EST MOD 30 MIN: CPT | Performed by: INTERNAL MEDICINE

## 2022-02-01 PROCEDURE — 1101F PT FALLS ASSESS-DOCD LE1/YR: CPT | Performed by: INTERNAL MEDICINE

## 2022-02-01 PROCEDURE — G8400 PT W/DXA NO RESULTS DOC: HCPCS | Performed by: INTERNAL MEDICINE

## 2022-02-01 PROCEDURE — 1090F PRES/ABSN URINE INCON ASSESS: CPT | Performed by: INTERNAL MEDICINE

## 2022-02-01 PROCEDURE — G8432 DEP SCR NOT DOC, RNG: HCPCS | Performed by: INTERNAL MEDICINE

## 2022-02-01 PROCEDURE — G8417 CALC BMI ABV UP PARAM F/U: HCPCS | Performed by: INTERNAL MEDICINE

## 2022-02-01 PROCEDURE — G8754 DIAS BP LESS 90: HCPCS | Performed by: INTERNAL MEDICINE

## 2022-02-01 RX ORDER — MELOXICAM 15 MG/1
TABLET ORAL
COMMUNITY
Start: 2022-01-18

## 2022-02-01 RX ORDER — POLYETHYLENE GLYCOL 3350 17 G/17G
POWDER, FOR SOLUTION ORAL
COMMUNITY

## 2022-02-01 NOTE — PROGRESS NOTES
1. Have you been to the ER, urgent care clinic since your last visit? Hospitalized since your last visit? No    2. Have you seen or consulted any other health care providers outside of the [address] [place] [address] since your last visit? Include any pap smears or colon screening.  Yes Where: Dr. Debra Ceja PCP for follow-up

## 2022-02-01 NOTE — PROGRESS NOTES
HISTORY OF PRESENT ILLNESS  Otilia Storey is a 68 y.o. female. 7/2019  Denies any chest pain tightness pressure. Has complaint of weakness in the leg when she stands up and feels numb. Previous MRI from 1/2018 noted. Advised to follow-up with spine doctor again. 12/2019  Patient seen in office today with complaint of left-sided pain. Describes pain in her left shoulder while she is sleeping on the nighttime. Also on movement of her shoulder it feels worse. No other associated symptoms. No exertional pain. 7/2021  Patient seen today for follow-up. Patient with episode of substernal pain more on laying down position worse on shifting position. No clear radiation. She is also short of breath after exercising for about 5 minutes. She has noted increase edema  2/2022  Patient seen today for follow-up. Her symptoms of chest pain is stable. Shortness of breath stable    CHF  The history is provided by the patient. This is a chronic problem. The problem occurs constantly. The problem has not changed since onset. Pertinent negatives include no chest pain, no abdominal pain, no headaches and no shortness of breath. The symptoms are aggravated by exertion. The symptoms are relieved by rest.   Hypertension  The history is provided by the patient. This is a chronic problem. The problem occurs constantly. The problem has not changed since onset. Pertinent negatives include no chest pain, no abdominal pain, no headaches and no shortness of breath. Review of Systems   Constitutional: Negative for chills and fever. HENT: Negative for nosebleeds. Eyes: Negative for blurred vision and double vision. Respiratory: Negative for cough, hemoptysis, sputum production, shortness of breath and wheezing. Cardiovascular: Negative for chest pain, palpitations, orthopnea, claudication, leg swelling and PND. Gastrointestinal: Negative for abdominal pain, heartburn, nausea and vomiting.    Musculoskeletal: Negative for myalgias. Skin: Negative for rash. Neurological: Negative for dizziness, weakness and headaches. Endo/Heme/Allergies: Does not bruise/bleed easily. Family History   Problem Relation Age of Onset    Other Mother         congestive heart failure    Hypertension Mother     Stroke Mother     Hypertension Father     Heart Attack Father     Hypertension Brother        Past Medical History:   Diagnosis Date    Bilateral knee pain     Chronic diastolic CHF (congestive heart failure) (HCC)     Chronic diastolic heart failure (HCC)     Dysuria     GERD (gastroesophageal reflux disease)     HTN (hypertension)     Hx of colonic polyp     Hypothyroidism     Morbid obesity ([address] 7/9/2013    Proteinuria     Transfusion history     No history of receiving blood or blood product transfusion(s).  Unspecified hypothyroidism     Urinary incontinence     UTI (urinary tract infection)        Past Surgical History:   Procedure Laterality Date    HX BREAST BIOPSY      HX BUNIONECTOMY      left big toe    HX COLONOSCOPY  8/26/11    HX DILATION AND CURETTAGE  2011       Social History     Tobacco Use    Smoking status: Never Smoker    Smokeless tobacco: Never Used   Substance Use Topics    Alcohol use: No     Comment: wine ocassionally lil wine       Allergies   Allergen Reactions    Ciprofloxacin Hives     Rash, nausea    Ciprofloxacin-Dexamethasone Nausea Only       Prior to Admission medications    Medication Sig Start Date End Date Taking? Authorizing Provider   meloxicam (MOBIC) 15 mg tablet  1/18/22  Yes Provider, Historical   polyethylene glycol (Miralax) 17 gram/dose powder Miralax 17 gram/dose oral powder   take as directed for bowel prep   Yes Provider, Historical   amLODIPine (NORVASC) 5 mg tablet TAKE 1 TABLET EVERY DAY 10/1/21  Yes Akhil Alston NP   metoprolol succinate (TOPROL-XL) 25 mg XL tablet Take 1 Tablet by mouth daily.  7/29/21  Yes Ivory Aguayo MD   furosemide (LASIX) 20 mg tablet Take 1 Tablet by mouth daily. 7/21/21  Yes Akhil Alston NP   acetaminophen (TYLENOL) 650 mg TbER Take 650 mg by mouth as needed for Pain. Yes Provider, Historical   liothyronine (CYTOMEL) 5 mcg tablet Take 5 mcg by mouth daily. Yes Provider, Historical   diclofenac (Voltaren) 1 % gel Apply  to affected area four (4) times daily. Yes Provider, Historical   aspirin delayed-release 81 mg tablet Take 1 Tab by mouth daily. 1/4/19  Yes Marianna Mortimer, MD   levothyroxine (SYNTHROID) 100 mcg tablet Take 88 mcg by mouth Daily (before breakfast). Yes Provider, Historical         Visit Vitals  BP (!) 133/59 (BP 1 Location: Left upper arm, BP Patient Position: Sitting, BP Cuff Size: Adult)   Pulse 61   Ht 5' 7\" (1.702 m)   Wt 126.6 kg (279 lb)   BMI 43.70 kg/m²         Physical Exam  Constitutional:       Appearance: She is well-developed. HENT:      Head: Normocephalic and atraumatic. Eyes:      Conjunctiva/sclera: Conjunctivae normal.   Neck:      Thyroid: No thyromegaly. Vascular: No JVD. Trachea: No tracheal deviation. Cardiovascular:      Rate and Rhythm: Normal rate and regular rhythm. Chest Wall: PMI is not displaced. Pulses: No decreased pulses. Heart sounds: No murmur heard. No gallop. No S3 sounds. Pulmonary:      Effort: No respiratory distress. Breath sounds: No wheezing or rales. Chest:      Chest wall: No tenderness. Abdominal:      Palpations: Abdomen is soft. Tenderness: There is no abdominal tenderness. Musculoskeletal:      Cervical back: Neck supple. Skin:     General: Skin is warm. Neurological:      Mental Status: She is alert and oriented to person, place, and time. Ms. Castillo Cabral has a reminder for a \"due or due soon\" health maintenance. I have asked that she contact her primary care provider for follow-up on this health maintenance. No flowsheet data found.   SUMMARY:echo-12/2018  Left ventricle: Systolic function was normal. Ejection fraction was  estimated in the range of 55 % to 60 %. There were no regional wall motion  abnormalities. There was mild concentric hypertrophy. Doppler parameters  were consistent with abnormal left ventricular relaxation (grade 1  diastolic dysfunction). Right ventricle: The size was at the upper limits of normal.    Mitral valve: There was mild annular calcification. Tricuspid valve: There was mild regurgitation. Pulmonic valve: There was mild regurgitation. NUCLEAR IMAGIN2018     Findings:   1. Stress images reveal normal Myoview distrubution in all the LV segments in short axis, vertical and horizontal long axis views. 2. Resting images have a normal uptake. 3. Gated images reveal normal wall motion and the ejection fraction is calculated to be 85%. Conclusion:   1. Normal perfusion scan. 2. Normal wall motion and ejection fraction. 3. No evidence of significant fixed or reversible defect suggesting ischemia or myocardial infarction noted from this nuclear study. 4. Low risk scan. Neena Xie MD Test Supervisor, ECG [place], SPECT Brook 2021   Procedure Conclusion    Nuclear Stress Test    Nuclear Cardiac Spect Rest then Gated Stress with tomographic imaging/tomography utilized for the tomographic myocardical perfusion imaging performed. Alexis Kuster was used as the stressing method and agent. (Alexis Kuster given via a 10 - 20 sec injection.)   One day myocardial perfusion study. Date: 2021. Left ventricular perfusion is abnormal. Myocardial perfusion imaging supports a high risk stress test.   There is a prior study available for comparison. . As compared to the previous study, there are significant changes. Perfusion defects appear to be new. The left ventricular ejection fraction has decreased. Segmental wall-motion abnormalities are new.  This Single Photon Emission Computer Tomograph (SPECT) study utilized tomographic imaging/tomography for the tomographic myocardial perfusion imaging performed during this study. Interpretation Summary    · SPECT: Left ventricular function post-stress was normal. Calculated ejection fraction is 77%. The TID ratio is 0.93. · Baseline ECG: Normal EKG. · SPECT: Myocardial perfusion imaging defect 1: There is a defect that is large in size with a moderate reduction in uptake present in the basal-apical, anteroseptal and lateral location(s) that is reversible. There is normal wall motion in the defect area. Viability in the area is good. The defect appears to be ischemia. · SPECT: Left ventricular perfusion is abnormal. Myocardial perfusion imaging supports a high risk stress test.        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 · Procedure done via right radial artery without any complications. Assessment         ICD-10-CM ICD-9-CM    1. Chronic diastolic heart failure (HCC)  I50.32 428.32     Stable compensated shortness of breath multifactorial continue treatment   2. Essential hypertension  I10 401.9     Stable monitor   3. Non-rheumatic mitral regurgitation  I34.0 424.0     Stable   4. Morbid obesity (HCC)  E66.01 278.01     Continue dietary modification and exercise   5. Acquired hypothyroidism  E03.9 244.9     Continue treatment follow-up lab with PCP   7/2018  CHF compensated. Blood pressure is controlled. Not requiring metoprolol. Will continue to monitor with salt restriction and diet  1/2019  Stable cardiac status. Blood pressure is controlled on diet alone. Continue aspirin. Check lipids with PCP  12/2019  Noncardiac left-sided chest pain likely related to shoulder. Blood pressure elevated. Started on amlodipine 5 mg a day  6/2020  Stable cardiac status continue to monitor. I have started HCTZ 25 mg a day.   This is mainly for ankle edema and mildly elevated blood pressure. She will continue to monitor and use it as needed. BMP in a few weeks  1/2021  Cardiac status stable. Mildly elevated blood pressure. Currently off HCTZ due to lightheadedness. Continue monitoring. Patient has continued to have progressive weight loss. Continue with dieting  7/2021  Seen with increased blood pressure increase edema and chest pain. Set up for testing add Lasix 20 mg a day. Follow BMP  2021-07-29  Patient seen today for follow-up. Likely has atypical angina and progressive symptom. Large reversible defect of anterior anteroseptal anterolateral wall. Proceed with cardiac catheterization  Check labs. Add statin add beta-blocker  8/2021  Stable cardiac status. Atypical angina stable. No significant CAD on recent cardiac cath continue treatment  Intolerant to statin. Myalgia with Lipitor. Improved after stopping that. Even though LDL is elevated we will start with dietary modification and hold off on another statin at present. 2/2022  Cardiac status stable continue current medical management monitor  There are no discontinued medications. No orders of the defined types were placed in this encounter. Follow-up and Dispositions    · Return in about 6 months (around 8/1/2022).

## 2022-02-23 ENCOUNTER — OFFICE VISIT (OUTPATIENT)
Dept: ORTHOPEDIC SURGERY | Age: 77
End: 2022-02-23
Payer: MEDICARE

## 2022-02-23 VITALS
BODY MASS INDEX: 44.51 KG/M2 | TEMPERATURE: 97 F | WEIGHT: 283.6 LBS | OXYGEN SATURATION: 100 % | RESPIRATION RATE: 18 BRPM | HEIGHT: 67 IN | HEART RATE: 63 BPM

## 2022-02-23 DIAGNOSIS — M25.512 LEFT SHOULDER PAIN, UNSPECIFIED CHRONICITY: ICD-10-CM

## 2022-02-23 PROCEDURE — G8756 NO BP MEASURE DOC: HCPCS | Performed by: ORTHOPAEDIC SURGERY

## 2022-02-23 PROCEDURE — G8417 CALC BMI ABV UP PARAM F/U: HCPCS | Performed by: ORTHOPAEDIC SURGERY

## 2022-02-23 PROCEDURE — G8432 DEP SCR NOT DOC, RNG: HCPCS | Performed by: ORTHOPAEDIC SURGERY

## 2022-02-23 PROCEDURE — 99214 OFFICE O/P EST MOD 30 MIN: CPT | Performed by: ORTHOPAEDIC SURGERY

## 2022-02-23 PROCEDURE — G8536 NO DOC ELDER MAL SCRN: HCPCS | Performed by: ORTHOPAEDIC SURGERY

## 2022-02-23 PROCEDURE — 1090F PRES/ABSN URINE INCON ASSESS: CPT | Performed by: ORTHOPAEDIC SURGERY

## 2022-02-23 PROCEDURE — G8400 PT W/DXA NO RESULTS DOC: HCPCS | Performed by: ORTHOPAEDIC SURGERY

## 2022-02-23 PROCEDURE — 20610 DRAIN/INJ JOINT/BURSA W/O US: CPT | Performed by: ORTHOPAEDIC SURGERY

## 2022-02-23 PROCEDURE — 73030 X-RAY EXAM OF SHOULDER: CPT | Performed by: ORTHOPAEDIC SURGERY

## 2022-02-23 PROCEDURE — 1101F PT FALLS ASSESS-DOCD LE1/YR: CPT | Performed by: ORTHOPAEDIC SURGERY

## 2022-02-23 PROCEDURE — G8427 DOCREV CUR MEDS BY ELIG CLIN: HCPCS | Performed by: ORTHOPAEDIC SURGERY

## 2022-02-23 RX ORDER — TRIAMCINOLONE ACETONIDE 40 MG/ML
40 INJECTION, SUSPENSION INTRA-ARTICULAR; INTRAMUSCULAR ONCE
Status: COMPLETED | OUTPATIENT
Start: 2022-02-23 | End: 2022-02-23

## 2022-02-23 RX ADMIN — TRIAMCINOLONE ACETONIDE 40 MG: 40 INJECTION, SUSPENSION INTRA-ARTICULAR; INTRAMUSCULAR at 10:19

## 2022-02-23 NOTE — PROGRESS NOTES
Patient: Pat Casey                MRN: [id number]       SSN: [id number]  YOB: 1945        AGE: 68 y.o. SEX: female  Body mass index is 44.42 kg/m². PCP: Steve Saldaña MD  02/23/22    CHIEF COMPLAINT: Left shoulder pain 8/10    HPI: Pat Casey is a 68 y.o. female patient who presents to the office today with left shoulder pain for the past 2 to 3 months. She describes no significant injury but has pain in the left shoulder especially with use of the arm and sleeping at night. She also reports pain over the top of her shoulder where her bra strap lies near the [facility] joint. She has not had any specific treatment for this up to this point time. Past Medical History:   Diagnosis Date    Bilateral knee pain     Chronic diastolic CHF (congestive heart failure) (HCC)     Chronic diastolic heart failure (HCC)     Dysuria     GERD (gastroesophageal reflux disease)     HTN (hypertension)     Hx of colonic polyp     Hypothyroidism     Morbid obesity ([address] 7/9/2013    Proteinuria     Transfusion history     No history of receiving blood or blood product transfusion(s).  Unspecified hypothyroidism     Urinary incontinence     UTI (urinary tract infection)        Family History   Problem Relation Age of Onset    Other Mother         congestive heart failure    Hypertension Mother     Stroke Mother     Hypertension Father     Heart Attack Father     Hypertension Brother        Current Outpatient Medications   Medication Sig Dispense Refill    meloxicam (MOBIC) 15 mg tablet       polyethylene glycol (Miralax) 17 gram/dose powder Miralax 17 gram/dose oral powder   take as directed for bowel prep      amLODIPine (NORVASC) 5 mg tablet TAKE 1 TABLET EVERY DAY 90 Tablet 3    metoprolol succinate (TOPROL-XL) 25 mg XL tablet Take 1 Tablet by mouth daily. 90 Tablet 3    furosemide (LASIX) 20 mg tablet Take 1 Tablet by mouth daily.  30 Tablet 0    acetaminophen (TYLENOL) 650 mg TbER Take 650 mg by mouth as needed for Pain.  liothyronine (CYTOMEL) 5 mcg tablet Take 5 mcg by mouth daily.  diclofenac (Voltaren) 1 % gel Apply  to affected area four (4) times daily.  aspirin delayed-release 81 mg tablet Take 1 Tab by mouth daily. 100 Tab 1    levothyroxine (SYNTHROID) 100 mcg tablet Take 88 mcg by mouth Daily (before breakfast). Allergies   Allergen Reactions    Ciprofloxacin Hives     Rash, nausea    Ciprofloxacin-Dexamethasone Nausea Only       Past Surgical History:   Procedure Laterality Date    HX BREAST BIOPSY      HX BUNIONECTOMY      left big toe    HX COLONOSCOPY  8/26/11    HX DILATION AND CURETTAGE  2011       Social History     Socioeconomic History    Marital status:      Spouse name: Not on file    Number of children: Not on file    Years of education: Not on file    Highest education level: Not on file   Occupational History    Not on file   Tobacco Use    Smoking status: Never Smoker    Smokeless tobacco: Never Used   Substance and Sexual Activity    Alcohol use: No     Comment: wine ocassionally lil wine    Drug use: No    Sexual activity: Not on file   Other Topics Concern    Not on file   Social History Narrative    Not on file     Social Determinants of Health     Financial Resource Strain:     Difficulty of Paying Living Expenses: Not on file   Food Insecurity:     Worried About Running Out of Food in the Last Year: Not on file    Natan of Food in the Last Year: Not on file   Transportation Needs:     Lack of Transportation (Medical): Not on file    Lack of Transportation (Non-Medical):  Not on file   Physical Activity:     Days of Exercise per Week: Not on file    Minutes of Exercise per Session: Not on file   Stress:     Feeling of Stress : Not on file   Social Connections:     Frequency of Communication with Friends and Family: Not on file    Frequency of Social Gatherings with Friends and Family: Not on file    Attends Jehovah's witness Services: Not on file    Active Member of Clubs or Organizations: Not on file    Attends Club or Organization Meetings: Not on file    Marital Status: Not on file   Intimate Partner Violence:     Fear of Current or Ex-Partner: Not on file    Emotionally Abused: Not on file    Physically Abused: Not on file    Sexually Abused: Not on file   Housing Stability:     Unable to Pay for Housing in the Last Year: Not on file    Number of [place] in the Last Year: Not on file    Unstable Housing in the Last Year: Not on file       REVIEW OF SYSTEMS:    14 point review of systems on the intake form is negative except as noted in the HPI    PHYSICAL EXAMINATION:  Visit Vitals  Pulse 63   Temp 97 °F (36.1 °C) (Temporal)   Resp 18   Ht 5' 7\" (1.702 m)   Wt 283 lb 9.6 oz (128.6 kg)   SpO2 100%   BMI 44.42 kg/m²     Body mass index is 44.42 kg/m². GENERAL: Alert and oriented x3, in no acute distress, well-developed, well-nourished. HEENT: Normocephalic, atraumatic. Shoulder Examination     R   L  ROM   FF  Full   Full  ER  Full   Full   IR  Full   Full  Rotator Cuff Pain   Supra  -   +   Infra  -   +   Subscap -   -  Crepitus  -   -  Effusion  -   -  Warmth  -   -   Erythema  -   -  Instability  -   -  AC Joint TTP  -   +  Clavicle   Deformity -   -   TTP  -   -  Proximal Humerus   Deformity -   -   TTP  -   -  Deltoid Strength 5   5  Biceps Strength 5   5  Biceps Deformity -   -  Biceps Groove Pain -   -  Impingement Sign -   -       IMAGING:  X-rays 4 views left shoulder were taken today. My interpretation is slight sclerosis of the greater tuberosity. No acute or chronic bony abnormalities are otherwise noted. No significant arthrosis. ASSESSMENT & PLAN  Diagnosis: Left shoulder rotator cuff pain    Ms Eric Wise has left shoulder pain with insidious onset without trauma that localizes to her rotator cuff today. For this I recommended we treated conservatively initially.   I recommended a home exercise program that was structured and we gave her the print out today and went over the exercises. I also recommended a corticosteroid injection into the left shoulder. She can also continue to take the meloxicam as needed for the pain which helps her knee pain as well as her shoulder pain. I would like to see her back in about 6 weeks to see how she does. If her symptoms persist we did discuss the possibility of an MRI. Prescription medication management discussed. [facility]  OFFICE PROCEDURE PROGRESS NOTE        Chart reviewed for the following:   Nakul Quinones MD, have reviewed the History, Physical and updated the Allergic reactions for Otilia [address] [place] [address] performed immediately prior to start of procedure:   Nakul Quinones MD, have performed the following reviews on Otilia Childers prior to the start of the procedure:            * Patient was identified by name and date of birth   * Agreement on procedure being performed was verified  * Risks and Benefits explained to the patient  * Procedure site verified and marked as necessary  * Patient was positioned for comfort  * Consent was signed and verified    Time: 10:13 AM    Location: Left shoulder joint intra-articular injection  SAS    Kenalog 40mg & 3cc Lidocaine    Date of procedure: 2/23/2022    Procedure performed by:  Ermelinda Dickson MD    Provider assisted by: Presley Zamarripa LPN    Patient assisted by: self    How tolerated by patient: tolerated the procedure well with no complications    Post Procedural Pain Scale: 0 - No Hurt    Comments: none      Electronically signed by: Ermelinda Dickson MD    Note: This note was completed using voice recognition software.   Any typographical/name errors or mistakes are unintentional.

## 2022-03-19 PROBLEM — I25.119 CORONARY ARTERY DISEASE INVOLVING NATIVE CORONARY ARTERY OF NATIVE HEART WITH ANGINA PECTORIS (HCC): Status: ACTIVE | Noted: 2021-08-03

## 2022-03-19 PROBLEM — R29.898 WEAKNESS OF BOTH LEGS: Status: ACTIVE | Noted: 2019-07-09

## 2022-07-06 RX ORDER — FUROSEMIDE 20 MG/1
TABLET ORAL
Qty: 90 TABLET | Refills: 3 | Status: SHIPPED | OUTPATIENT
Start: 2022-07-06 | End: 2022-08-04

## 2022-08-04 RX ORDER — FUROSEMIDE 20 MG/1
TABLET ORAL
Qty: 90 TABLET | Refills: 3 | Status: SHIPPED | OUTPATIENT
Start: 2022-08-04

## 2022-08-12 ENCOUNTER — OFFICE VISIT (OUTPATIENT)
Dept: CARDIOLOGY CLINIC | Age: 77
End: 2022-08-12
Payer: MEDICARE

## 2022-08-12 VITALS
HEIGHT: 67 IN | DIASTOLIC BLOOD PRESSURE: 60 MMHG | SYSTOLIC BLOOD PRESSURE: 134 MMHG | BODY MASS INDEX: 42.85 KG/M2 | OXYGEN SATURATION: 99 % | WEIGHT: 273 LBS | HEART RATE: 72 BPM

## 2022-08-12 DIAGNOSIS — I34.0 NON-RHEUMATIC MITRAL REGURGITATION: ICD-10-CM

## 2022-08-12 DIAGNOSIS — I10 ESSENTIAL HYPERTENSION: ICD-10-CM

## 2022-08-12 DIAGNOSIS — E66.01 MORBID OBESITY (HCC): ICD-10-CM

## 2022-08-12 DIAGNOSIS — I50.32 CHRONIC DIASTOLIC HEART FAILURE (HCC): Primary | ICD-10-CM

## 2022-08-12 PROCEDURE — 1123F ACP DISCUSS/DSCN MKR DOCD: CPT | Performed by: INTERNAL MEDICINE

## 2022-08-12 PROCEDURE — G8400 PT W/DXA NO RESULTS DOC: HCPCS | Performed by: INTERNAL MEDICINE

## 2022-08-12 PROCEDURE — 1101F PT FALLS ASSESS-DOCD LE1/YR: CPT | Performed by: INTERNAL MEDICINE

## 2022-08-12 PROCEDURE — G8536 NO DOC ELDER MAL SCRN: HCPCS | Performed by: INTERNAL MEDICINE

## 2022-08-12 PROCEDURE — G8752 SYS BP LESS 140: HCPCS | Performed by: INTERNAL MEDICINE

## 2022-08-12 PROCEDURE — G8510 SCR DEP NEG, NO PLAN REQD: HCPCS | Performed by: INTERNAL MEDICINE

## 2022-08-12 PROCEDURE — 1090F PRES/ABSN URINE INCON ASSESS: CPT | Performed by: INTERNAL MEDICINE

## 2022-08-12 PROCEDURE — G8417 CALC BMI ABV UP PARAM F/U: HCPCS | Performed by: INTERNAL MEDICINE

## 2022-08-12 PROCEDURE — G8427 DOCREV CUR MEDS BY ELIG CLIN: HCPCS | Performed by: INTERNAL MEDICINE

## 2022-08-12 PROCEDURE — G8754 DIAS BP LESS 90: HCPCS | Performed by: INTERNAL MEDICINE

## 2022-08-12 PROCEDURE — 99214 OFFICE O/P EST MOD 30 MIN: CPT | Performed by: INTERNAL MEDICINE

## 2022-08-12 NOTE — PROGRESS NOTES
1. Have you been to the ER, urgent care clinic since your last visit? Hospitalized since your last visit? No    2. Have you seen or consulted any other health care providers outside of the [address] [place] [address] since your last visit? Include any pap smears or colon screening.  No

## 2022-08-12 NOTE — PROGRESS NOTES
HISTORY OF PRESENT ILLNESS  Otilia Pascual is a 68 y.o. female. 7/2019  Denies any chest pain tightness pressure. Has complaint of weakness in the leg when she stands up and feels numb. Previous MRI from 1/2018 noted. Advised to follow-up with spine doctor again. 12/2019  Patient seen in office today with complaint of left-sided pain. Describes pain in her left shoulder while she is sleeping on the nighttime. Also on movement of her shoulder it feels worse. No other associated symptoms. No exertional pain. 7/2021  Patient seen today for follow-up. Patient with episode of substernal pain more on laying down position worse on shifting position. No clear radiation. She is also short of breath after exercising for about 5 minutes. She has noted increase edema  2/2022  Patient seen today for follow-up. Her symptoms of chest pain is stable. Shortness of breath stable    CHF  The history is provided by the Patient. This is a chronic problem. The problem occurs constantly. The problem has not changed since onset. Pertinent negatives include no chest pain, no abdominal pain, no headaches and no shortness of breath. The symptoms are aggravated by exertion. The symptoms are relieved by rest.   Hypertension  The history is provided by the Patient. This is a chronic problem. The problem occurs constantly. The problem has not changed since onset. Pertinent negatives include no chest pain, no abdominal pain, no headaches and no shortness of breath. Pertinent negatives include no chest pain, no abdominal pain, no headaches and no shortness of breath. Review of Systems   Constitutional:  Negative for chills and fever. HENT:  Negative for nosebleeds. Eyes:  Negative for blurred vision and double vision. Respiratory:  Negative for cough, hemoptysis, sputum production, shortness of breath and wheezing. Cardiovascular:  Negative for chest pain, palpitations, orthopnea, claudication, leg swelling and PND. Gastrointestinal:  Negative for abdominal pain, heartburn, nausea and vomiting. Musculoskeletal:  Negative for myalgias. Skin:  Negative for rash. Neurological:  Negative for dizziness, weakness and headaches. Endo/Heme/Allergies:  Does not bruise/bleed easily. Family History   Problem Relation Age of Onset    Other Mother         congestive heart failure    Hypertension Mother     Stroke Mother     Hypertension Father     Heart Attack Father     Hypertension Brother        Past Medical History:   Diagnosis Date    Bilateral knee pain     Chronic diastolic CHF (congestive heart failure) (HCC)     Chronic diastolic heart failure (HCC)     Dysuria     GERD (gastroesophageal reflux disease)     HTN (hypertension)     Hx of colonic polyp     Hypothyroidism     Morbid obesity ([facility] [address] 7/9/2013    Proteinuria     Transfusion history     No history of receiving blood or blood product transfusion(s). Unspecified hypothyroidism     Urinary incontinence     UTI (urinary tract infection)        Past Surgical History:   Procedure Laterality Date    HX BREAST BIOPSY      HX BUNIONECTOMY      left big toe    HX COLONOSCOPY  8/26/11    HX DILATION AND CURETTAGE  2011       Social History     Tobacco Use    Smoking status: Never    Smokeless tobacco: Never   Substance Use Topics    Alcohol use: No     Comment: wine ocassionally lil wine       Allergies   Allergen Reactions    Ciprofloxacin Hives     Rash, nausea    Ciprofloxacin-Dexamethasone Nausea Only       Prior to Admission medications    Medication Sig Start Date End Date Taking?  Authorizing Provider   furosemide (LASIX) 20 mg tablet TAKE 1 TABLET EVERY DAY 8/4/22   Bridget Bruner MD   meloxicam [facility]) 15 mg tablet  1/18/22   Provider, Historical   polyethylene glycol (Miralax) 17 gram/dose powder Miralax 17 gram/dose oral powder   take as directed for bowel prep    Provider, Historical   amLODIPine (NORVASC) 5 mg tablet TAKE 1 TABLET EVERY DAY 10/1/21   Black, Akhil VERDIN NP   metoprolol succinate (TOPROL-XL) 25 mg XL tablet Take 1 Tablet by mouth daily. 7/29/21   Star Dey MD   acetaminophen (TYLENOL) 650 mg TbER Take 650 mg by mouth as needed for Pain. Provider, Historical   liothyronine (CYTOMEL) 5 mcg tablet Take 5 mcg by mouth daily. Provider, Historical   diclofenac (Voltaren) 1 % gel Apply  to affected area four (4) times daily. Provider, Historical   aspirin delayed-release 81 mg tablet Take 1 Tab by mouth daily. 1/4/19   Star Dey MD   levothyroxine (SYNTHROID) 100 mcg tablet Take 88 mcg by mouth Daily (before breakfast). Provider, Historical         Visit Vitals  Ht 5' 7\" (1.702 m)   BMI 44.42 kg/m²         Physical Exam  Constitutional:       Appearance: She is well-developed. HENT:      Head: Normocephalic and atraumatic. Eyes:      Conjunctiva/sclera: Conjunctivae normal.   Neck:      Thyroid: No thyromegaly. Vascular: No JVD. Trachea: No tracheal deviation. Cardiovascular:      Rate and Rhythm: Normal rate and regular rhythm. Chest Wall: PMI is not displaced. Pulses: No decreased pulses. Heart sounds: No murmur heard. No gallop. No S3 sounds. Pulmonary:      Effort: No respiratory distress. Breath sounds: No wheezing or rales. Chest:      Chest wall: No tenderness. Abdominal:      Palpations: Abdomen is soft. Tenderness: There is no abdominal tenderness. Musculoskeletal:      Cervical back: Neck supple. Skin:     General: Skin is warm. Neurological:      Mental Status: She is alert and oriented to person, place, and time. Ms. Shania Yuen has a reminder for a \"due or due soon\" health maintenance. I have asked that she contact her primary care provider for follow-up on this health maintenance. No flowsheet data found. SUMMARY:echo-12/2018  Left ventricle: Systolic function was normal. Ejection fraction was  estimated in the range of 55 % to 60 %.  There were no regional wall motion  abnormalities. There was mild concentric hypertrophy. Doppler parameters  were consistent with abnormal left ventricular relaxation (grade 1  diastolic dysfunction). Right ventricle: The size was at the upper limits of normal.    Mitral valve: There was mild annular calcification. Tricuspid valve: There was mild regurgitation. Pulmonic valve: There was mild regurgitation. NUCLEAR IMAGIN2018     Findings:   1. Stress images reveal normal Myoview distrubution in all the LV segments in short axis, vertical and horizontal long axis views. 2. Resting images have a normal uptake. 3. Gated images reveal normal wall motion and the ejection fraction is calculated to be 85%. Conclusion:   1. Normal perfusion scan. 2. Normal wall motion and ejection fraction. 3. No evidence of significant fixed or reversible defect suggesting ischemia or myocardial infarction noted from this nuclear study. 4. Low risk scan. Risa Garcia MD Test Supervisor, ECG [place], SPECT [place] 2021   Procedure Conclusion    Nuclear Stress Test    Nuclear Cardiac Spect Rest then Gated Stress with tomographic imaging/tomography utilized for the tomographic myocardical perfusion imaging performed. Gaming Sylwia was used as the stressing method and agent. (Gaming Sylwia given via a 10 - 20 sec injection.)   One day myocardial perfusion study. Date: 2021. Left ventricular perfusion is abnormal. Myocardial perfusion imaging supports a high risk stress test.   There is a prior study available for comparison. . As compared to the previous study, there are significant changes. Perfusion defects appear to be new. The left ventricular ejection fraction has decreased. Segmental wall-motion abnormalities are new. This Single Photon Emission Computer Tomograph (SPECT) study utilized tomographic imaging/tomography for the tomographic myocardial perfusion imaging performed during this study.    Interpretation Summary    SPECT: Left ventricular function post-stress was normal. Calculated ejection fraction is 77%. The TID ratio is 0.93. Baseline ECG: Normal EKG. SPECT: Myocardial perfusion imaging defect 1: There is a defect that is large in size with a moderate reduction in uptake present in the basal-apical, anteroseptal and lateral location(s) that is reversible. There is normal wall motion in the defect area. Viability in the area is good. The defect appears to be ischemia. SPECT: Left ventricular perfusion is abnormal. Myocardial perfusion imaging supports a high risk stress test.        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 Procedure done via right radial artery without any complications. Assessment         ICD-10-CM ICD-9-CM    1. Coronary artery disease involving native coronary artery of native heart with angina pectoris (HCC)  I25.119 414.01      413.9     Stable symptom continue therapy monitor      2. Chronic diastolic heart failure (HCC)  I50.32 428.32     Stable compensated continue treatment and monitor      3. Essential hypertension  I10 401.9     Stable monitor      4. Non-rheumatic mitral regurgitation  I34.0 424.0     Stable monitor      5. Morbid obesity ([facility] [address]  E66.01 278.01     Continue dietary modification and exercise      7/2018  CHF compensated. Blood pressure is controlled. Not requiring metoprolol. Will continue to monitor with salt restriction and diet  1/2019  Stable cardiac status. Blood pressure is controlled on diet alone. Continue aspirin. Check lipids with PCP  12/2019  Noncardiac left-sided chest pain likely related to shoulder. Blood pressure elevated. Started on amlodipine 5 mg a day  6/2020  Stable cardiac status continue to monitor. I have started HCTZ 25 mg a day.   This is mainly for ankle edema and mildly elevated blood pressure. She will continue to monitor and use it as needed. BMP in a few weeks  1/2021  Cardiac status stable. Mildly elevated blood pressure. Currently off HCTZ due to lightheadedness. Continue monitoring. Patient has continued to have progressive weight loss. Continue with dieting  7/2021  Seen with increased blood pressure increase edema and chest pain. Set up for testing add Lasix 20 mg a day. Follow BMP  2021-07-29  Patient seen today for follow-up. Likely has atypical angina and progressive symptom. Large reversible defect of anterior anteroseptal anterolateral wall. Proceed with cardiac catheterization  Check labs. Add statin add beta-blocker  8/2021  Stable cardiac status. Atypical angina stable. No significant CAD on recent cardiac cath continue treatment  Intolerant to statin. Myalgia with Lipitor. Improved after stopping that. Even though LDL is elevated we will start with dietary modification and hold off on another statin at present. 2/2022  Cardiac status stable continue current medical management monitor  8/2022  Stable cardiac status continue medical management monitor  There are no discontinued medications. No orders of the defined types were placed in this encounter.

## 2022-11-23 RX ORDER — AMLODIPINE BESYLATE 5 MG/1
TABLET ORAL
Qty: 90 TABLET | Refills: 3 | Status: SHIPPED | OUTPATIENT
Start: 2022-11-23

## 2023-02-23 ENCOUNTER — OFFICE VISIT (OUTPATIENT)
Age: 78
End: 2023-02-23

## 2023-02-23 VITALS
OXYGEN SATURATION: 100 % | BODY MASS INDEX: 43.79 KG/M2 | WEIGHT: 279 LBS | SYSTOLIC BLOOD PRESSURE: 162 MMHG | DIASTOLIC BLOOD PRESSURE: 68 MMHG | HEIGHT: 67 IN | HEART RATE: 64 BPM

## 2023-02-23 DIAGNOSIS — E66.01 MORBID (SEVERE) OBESITY DUE TO EXCESS CALORIES (HCC): ICD-10-CM

## 2023-02-23 DIAGNOSIS — I34.0 NONRHEUMATIC MITRAL (VALVE) INSUFFICIENCY: ICD-10-CM

## 2023-02-23 DIAGNOSIS — I50.32 CHRONIC DIASTOLIC (CONGESTIVE) HEART FAILURE (HCC): Primary | ICD-10-CM

## 2023-02-23 DIAGNOSIS — I10 ESSENTIAL (PRIMARY) HYPERTENSION: ICD-10-CM

## 2023-02-23 PROCEDURE — 3077F SYST BP >= 140 MM HG: CPT | Performed by: INTERNAL MEDICINE

## 2023-02-23 PROCEDURE — 1123F ACP DISCUSS/DSCN MKR DOCD: CPT | Performed by: INTERNAL MEDICINE

## 2023-02-23 PROCEDURE — 99214 OFFICE O/P EST MOD 30 MIN: CPT | Performed by: INTERNAL MEDICINE

## 2023-02-23 PROCEDURE — 3078F DIAST BP <80 MM HG: CPT | Performed by: INTERNAL MEDICINE

## 2023-02-23 RX ORDER — ERGOCALCIFEROL 1.25 MG/1
CAPSULE ORAL
COMMUNITY

## 2023-02-23 ASSESSMENT — PATIENT HEALTH QUESTIONNAIRE - PHQ9
1. LITTLE INTEREST OR PLEASURE IN DOING THINGS: 0
SUM OF ALL RESPONSES TO PHQ9 QUESTIONS 1 & 2: 0
SUM OF ALL RESPONSES TO PHQ QUESTIONS 1-9: 0
2. FEELING DOWN, DEPRESSED OR HOPELESS: 0
SUM OF ALL RESPONSES TO PHQ QUESTIONS 1-9: 0
DEPRESSION UNABLE TO ASSESS: FUNCTIONAL CAPACITY MOTIVATION LIMITS ACCURACY

## 2023-02-23 NOTE — PROGRESS NOTES
HISTORY OF PRESENT ILLNESS  Ramila Anne is a 68 y.o. female. 7/2019  Denies any chest pain tightness pressure. Has complaint of weakness in the leg when she stands up and feels numb. Previous MRI from 1/2018 noted. Advised to follow-up with spine doctor again. 12/2019  Patient seen in office today with complaint of left-sided pain. Describes pain in her left shoulder while she is sleeping on the nighttime. Also on movement of her shoulder it feels worse. No other associated symptoms. No exertional pain. 7/2021  Patient seen today for follow-up. Patient with episode of substernal pain more on laying down position worse on shifting position. No clear radiation. She is also short of breath after exercising for about 5 minutes. She has noted increase edema  2/2022  Patient seen today for follow-up. Her symptoms of chest pain is stable. Shortness of breath stable    CHF  The history is provided by the Patient. This is a chronic problem. The problem occurs constantly. The problem has not changed since onset. Pertinent negatives include no chest pain, no abdominal pain, no headaches and no shortness of breath. The symptoms are aggravated by exertion. The symptoms are relieved by rest.   Hypertension  The history is provided by the Patient. This is a chronic problem. The problem occurs constantly. The problem has not changed since onset. Pertinent negatives include no chest pain, no abdominal pain, no headaches and no shortness of breath. Pertinent negatives include no chest pain, no abdominal pain, no headaches and no shortness of breath. Review of Systems   Constitutional:  Negative for chills and fever. HENT:  Negative for nosebleeds. Eyes:  Negative for blurred vision and double vision. Respiratory:  Negative for cough, hemoptysis, sputum production, shortness of breath and wheezing. Cardiovascular:  Negative for chest pain, palpitations, orthopnea, claudication, leg swelling and PND. Gastrointestinal:  Negative for abdominal pain, heartburn, nausea and vomiting. Musculoskeletal:  Negative for myalgias. Skin:  Negative for rash. Neurological:  Negative for dizziness, weakness and headaches. Endo/Heme/Allergies:  Does not bruise/bleed easily. Family History   Problem Relation Age of Onset    Hypertension Brother     Heart Attack Father     Hypertension Father     Stroke Mother     Other Mother         congestive heart failure    Hypertension Mother        Past Medical History:   Diagnosis Date    Bilateral knee pain     Chronic diastolic CHF (congestive heart failure) (HCC)     Chronic diastolic heart failure (HCC)     Dysuria     GERD (gastroesophageal reflux disease)     HTN (hypertension)     Hx of colonic polyp     Hypothyroidism     Morbid obesity ([facility] [address] 7/9/2013    Proteinuria     Transfusion history     No history of receiving blood or blood product transfusion(s). Unspecified hypothyroidism     Urinary incontinence     UTI (urinary tract infection)        Past Surgical History:   Procedure Laterality Date    BREAST BIOPSY      BUNIONECTOMY      left big toe    COLONOSCOPY  8/26/11    DILATION AND CURETTAGE OF UTERUS  2011       Social History     Tobacco Use    Smoking status: Never    Smokeless tobacco: Never   Substance Use Topics    Alcohol use: No       Allergies   Allergen Reactions    Ciprofloxacin Hives     Rash, nausea    Ciprofloxacin-Dexamethasone Nausea Only       Prior to Admission medications    Medication Sig Start Date End Date Taking?  Authorizing Provider   ergocalciferol (ERGOCALCIFEROL) 1.25 MG (05761 UT) capsule ergocalciferol (vitamin D2) 1,250 mcg (50,000 unit) capsule   Yes Historical Provider, MD   acetaminophen (TYLENOL) 650 MG extended release tablet Take 650 mg by mouth as needed   Yes Ar Automatic Reconciliation   amLODIPine (NORVASC) 5 MG tablet TAKE 1 TABLET EVERY DAY 11/23/22  Yes Ar Automatic Reconciliation   aspirin 81 MG EC tablet Take 81 mg by mouth daily 1/4/19  Yes Ar Automatic Reconciliation   diclofenac sodium (VOLTAREN) 1 % GEL Apply topically 4 times daily   Yes Ar Automatic Reconciliation   furosemide (LASIX) 20 MG tablet TAKE 1 TABLET EVERY DAY 8/4/22  Yes Ar Automatic Reconciliation   levothyroxine (SYNTHROID) 100 MCG tablet Take 100 mcg by mouth every morning (before breakfast)   Yes Ar Automatic Reconciliation   liothyronine (CYTOMEL) 5 MCG tablet Take 5 mcg by mouth daily   Yes Ar Automatic Reconciliation   meloxicam (MOBIC) 15 MG tablet ceived the following from [facility]: Outside name: meloxicam (MOBIC) 15 mg tablet 1/18/22  Yes Ar Automatic Reconciliation   metoprolol succinate (TOPROL XL) 25 MG extended release tablet Take 25 mg by mouth daily 7/29/21  Yes Ar Automatic Reconciliation   polyethylene glycol (GLYCOLAX) 17 GM/SCOOP powder Miralax 17 gram/dose oral powder   take as directed for bowel prep   Yes Ar Automatic Reconciliation         BP (!) 162/68 (Site: Left Upper Arm, Position: Sitting, Cuff Size: Large Adult)   Pulse 64   Ht 5' 7\" (1.702 m)   Wt 279 lb (126.6 kg)   SpO2 100%   BMI 43.70 kg/m²   Physical Exam  Constitutional:       Appearance: She is well-developed. HENT:      Head: Normocephalic and atraumatic. Eyes:      Conjunctiva/sclera: Conjunctivae normal.   Neck:      Thyroid: No thyromegaly. Vascular: No JVD. Trachea: No tracheal deviation. Cardiovascular:      Rate and Rhythm: Normal rate and regular rhythm. Chest Wall: PMI is not displaced. Pulses: No decreased pulses. Heart sounds: No murmur heard. No gallop. No S3 sounds. Pulmonary:      Effort: No respiratory distress. Breath sounds: No wheezing or rales. Chest:      Chest wall: No tenderness. Abdominal:      Palpations: Abdomen is soft. Tenderness: There is no abdominal tenderness. Musculoskeletal:      Cervical back: Neck supple. Skin:     General: Skin is warm.    Neurological: Mental Status: She is alert and oriented to person, place, and time. Ms. David Diaz has a reminder for a \"due or due soon\" health maintenance. I have asked that she contact her primary care provider for follow-up on this health maintenance. No flowsheet data found. SUMMARY:echo-2018  Left ventricle: Systolic function was normal. Ejection fraction was  estimated in the range of 55 % to 60 %. There were no regional wall motion  abnormalities. There was mild concentric hypertrophy. Doppler parameters  were consistent with abnormal left ventricular relaxation (grade 1  diastolic dysfunction). Right ventricle: The size was at the upper limits of normal.    Mitral valve: There was mild annular calcification. Tricuspid valve: There was mild regurgitation. Pulmonic valve: There was mild regurgitation. NUCLEAR IMAGIN2018     Findings:   1. Stress images reveal normal Myoview distrubution in all the LV segments in short axis, vertical and horizontal long axis views. 2. Resting images have a normal uptake. 3. Gated images reveal normal wall motion and the ejection fraction is calculated to be 85%. Conclusion:   1. Normal perfusion scan. 2. Normal wall motion and ejection fraction. 3. No evidence of significant fixed or reversible defect suggesting ischemia or myocardial infarction noted from this nuclear study. 4. Low risk scan. Zen Kamara MD Test Supervisor, ECG [place], SPECT [place] 2021   Procedure Conclusion    Nuclear Stress Test    Nuclear Cardiac Spect Rest then Gated Stress with tomographic imaging/tomography utilized for the tomographic myocardical perfusion imaging performed. Geovanna Herrera was used as the stressing method and agent. (Geovanna Herrera given via a 10 - 20 sec injection.)   One day myocardial perfusion study. Date: 2021.    Left ventricular perfusion is abnormal. Myocardial perfusion imaging supports a high risk stress test.   There is a prior study available for comparison. . As compared to the previous study, there are significant changes. Perfusion defects appear to be new. The left ventricular ejection fraction has decreased. Segmental wall-motion abnormalities are new. This Single Photon Emission Computer Tomograph (SPECT) study utilized tomographic imaging/tomography for the tomographic myocardial perfusion imaging performed during this study. Interpretation Summary    SPECT: Left ventricular function post-stress was normal. Calculated ejection fraction is 77%. The TID ratio is 0.93. Baseline ECG: Normal EKG. SPECT: Myocardial perfusion imaging defect 1: There is a defect that is large in size with a moderate reduction in uptake present in the basal-apical, anteroseptal and lateral location(s) that is reversible. There is normal wall motion in the defect area. Viability in the area is good. The defect appears to be ischemia. SPECT: Left ventricular perfusion is abnormal. Myocardial perfusion imaging supports a high risk stress test.        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 Procedure done via right radial artery without any complications. No flowsheet data found. Assessment        Diagnosis Orders   1. Chronic diastolic (congestive) heart failure (HCC)  Transthoracic echocardiogram (TTE) complete with contrast, bubble, strain, and 3D PRN    Stable compensated, shortness of breath on exertion. Continue treatment      2. Nonrheumatic mitral (valve) insufficiency  Transthoracic echocardiogram (TTE) complete with contrast, bubble, strain, and 3D PRN    Stable monitor      3. Essential (primary) hypertension      Stable continue treatment      4.  Morbid (severe) obesity due to excess calories (HCC)      Continue dietary modification and exercise          There are no discontinued medications. Follow-up and Dispositions    Return for F/U with [facility] after tests. 7/2018  CHF compensated. Blood pressure is controlled. Not requiring metoprolol. Will continue to monitor with salt restriction and diet  1/2019  Stable cardiac status. Blood pressure is controlled on diet alone. Continue aspirin. Check lipids with PCP  12/2019  Noncardiac left-sided chest pain likely related to shoulder. Blood pressure elevated. Started on amlodipine 5 mg a day  6/2020  Stable cardiac status continue to monitor. I have started HCTZ 25 mg a day. This is mainly for ankle edema and mildly elevated blood pressure. She will continue to monitor and use it as needed. BMP in a few weeks  1/2021  Cardiac status stable. Mildly elevated blood pressure. Currently off HCTZ due to lightheadedness. Continue monitoring. Patient has continued to have progressive weight loss. Continue with dieting  7/2021  Seen with increased blood pressure increase edema and chest pain. Set up for testing add Lasix 20 mg a day. Follow BMP  2021-07-29  Patient seen today for follow-up. Likely has atypical angina and progressive symptom. Large reversible defect of anterior anteroseptal anterolateral wall. Proceed with cardiac catheterization  Check labs. Add statin add beta-blocker  8/2021  Stable cardiac status. Atypical angina stable. No significant CAD on recent cardiac cath continue treatment  Intolerant to statin. Myalgia with Lipitor. Improved after stopping that. Even though LDL is elevated we will start with dietary modification and hold off on another statin at present. 2/2022  Cardiac status stable continue current medical management monitor  8/2022  Stable cardiac status continue medical management monitor  2/2023  Cardiac status stable. CHF compensated continue medical management  Shortness of breath on exertion. Blood pressure elevated today usually runs normal will monitor.   On echo check for LVH.   Decide on medication adjustment

## 2023-03-09 ENCOUNTER — OFFICE VISIT (OUTPATIENT)
Age: 78
End: 2023-03-09
Payer: MEDICARE

## 2023-03-09 VITALS
DIASTOLIC BLOOD PRESSURE: 65 MMHG | OXYGEN SATURATION: 100 % | BODY MASS INDEX: 43.79 KG/M2 | HEART RATE: 53 BPM | HEIGHT: 67 IN | SYSTOLIC BLOOD PRESSURE: 139 MMHG | WEIGHT: 279 LBS

## 2023-03-09 DIAGNOSIS — I10 ESSENTIAL (PRIMARY) HYPERTENSION: ICD-10-CM

## 2023-03-09 DIAGNOSIS — E66.01 MORBID (SEVERE) OBESITY DUE TO EXCESS CALORIES (HCC): ICD-10-CM

## 2023-03-09 DIAGNOSIS — I34.0 NONRHEUMATIC MITRAL (VALVE) INSUFFICIENCY: ICD-10-CM

## 2023-03-09 DIAGNOSIS — I50.32 CHRONIC DIASTOLIC (CONGESTIVE) HEART FAILURE (HCC): Primary | ICD-10-CM

## 2023-03-09 DIAGNOSIS — E03.9 HYPOTHYROIDISM, UNSPECIFIED TYPE: ICD-10-CM

## 2023-03-09 PROCEDURE — G8427 DOCREV CUR MEDS BY ELIG CLIN: HCPCS | Performed by: NURSE PRACTITIONER

## 2023-03-09 PROCEDURE — 1036F TOBACCO NON-USER: CPT | Performed by: NURSE PRACTITIONER

## 2023-03-09 PROCEDURE — 99214 OFFICE O/P EST MOD 30 MIN: CPT | Performed by: NURSE PRACTITIONER

## 2023-03-09 PROCEDURE — 3078F DIAST BP <80 MM HG: CPT | Performed by: NURSE PRACTITIONER

## 2023-03-09 PROCEDURE — 1123F ACP DISCUSS/DSCN MKR DOCD: CPT | Performed by: NURSE PRACTITIONER

## 2023-03-09 PROCEDURE — 1090F PRES/ABSN URINE INCON ASSESS: CPT | Performed by: NURSE PRACTITIONER

## 2023-03-09 PROCEDURE — G8417 CALC BMI ABV UP PARAM F/U: HCPCS | Performed by: NURSE PRACTITIONER

## 2023-03-09 PROCEDURE — G8400 PT W/DXA NO RESULTS DOC: HCPCS | Performed by: NURSE PRACTITIONER

## 2023-03-09 PROCEDURE — 3075F SYST BP GE 130 - 139MM HG: CPT | Performed by: NURSE PRACTITIONER

## 2023-03-09 PROCEDURE — G8484 FLU IMMUNIZE NO ADMIN: HCPCS | Performed by: NURSE PRACTITIONER

## 2023-03-09 NOTE — PROGRESS NOTES
HISTORY OF PRESENT ILLNESS  Ramila Jeffery is a 68 y.o. female. 7/2019  Denies any chest pain tightness pressure. Has complaint of weakness in the leg when she stands up and feels numb. Previous MRI from 1/2018 noted. Advised to follow-up with spine doctor again. 12/2019  Patient seen in office today with complaint of left-sided pain. Describes pain in her left shoulder while she is sleeping on the nighttime. Also on movement of her shoulder it feels worse. No other associated symptoms. No exertional pain. 7/2021  Patient seen today for follow-up. Patient with episode of substernal pain more on laying down position worse on shifting position. No clear radiation. She is also short of breath after exercising for about 5 minutes. She has noted increase edema  2/2022  Patient seen today for follow-up. Her symptoms of chest pain is stable. Shortness of breath stable  3/2023  Patient seen in follow up for echocardiogram results. She denies chest pain, shortness of breath, palpitations or edema. CHF  The history is provided by the Patient. This is a chronic problem. The problem occurs constantly. The problem has not changed since onset. Pertinent negatives include no chest pain, no abdominal pain, no headaches and no shortness of breath. The symptoms are aggravated by exertion. The symptoms are relieved by rest.   Hypertension  The history is provided by the Patient. This is a chronic problem. The problem occurs constantly. The problem has not changed since onset. Pertinent negatives include no chest pain, no abdominal pain, no headaches and no shortness of breath. Pertinent negatives include no chest pain, no abdominal pain, no headaches and no shortness of breath. Review of Systems   Constitutional:  Negative for chills and fever. HENT:  Negative for nosebleeds. Eyes:  Negative for blurred vision and double vision.    Respiratory:  Negative for cough, hemoptysis, sputum production, shortness of breath and wheezing. Cardiovascular:  Negative for chest pain, palpitations, orthopnea, claudication, leg swelling and PND. Gastrointestinal:  Negative for abdominal pain, heartburn, nausea and vomiting. Musculoskeletal:  Negative for myalgias. Skin:  Negative for rash. Neurological:  Negative for dizziness, weakness and headaches. Endo/Heme/Allergies:  Does not bruise/bleed easily. Family History   Problem Relation Age of Onset    Hypertension Brother     Heart Attack Father     Hypertension Father     Stroke Mother     Other Mother         congestive heart failure    Hypertension Mother        Past Medical History:   Diagnosis Date    Bilateral knee pain     Chronic diastolic CHF (congestive heart failure) (HCC)     Chronic diastolic heart failure (HCC)     Dysuria     GERD (gastroesophageal reflux disease)     HTN (hypertension)     Hx of colonic polyp     Hypothyroidism     Morbid obesity ([facility] [address] 7/9/2013    Proteinuria     Transfusion history     No history of receiving blood or blood product transfusion(s). Unspecified hypothyroidism     Urinary incontinence     UTI (urinary tract infection)        Past Surgical History:   Procedure Laterality Date    BREAST BIOPSY      BUNIONECTOMY      left big toe    COLONOSCOPY  8/26/11    DILATION AND CURETTAGE OF UTERUS  2011       Social History     Tobacco Use    Smoking status: Never    Smokeless tobacco: Never   Substance Use Topics    Alcohol use: No       Allergies   Allergen Reactions    Ciprofloxacin Hives     Rash, nausea    Ciprofloxacin-Dexamethasone Nausea Only    Statins Myalgia       Prior to Admission medications    Medication Sig Start Date End Date Taking?  Authorizing Provider   ergocalciferol (ERGOCALCIFEROL) 1.25 MG (69480 UT) capsule ergocalciferol (vitamin D2) 1,250 mcg (50,000 unit) capsule   Yes Historical Provider, MD   acetaminophen (TYLENOL) 650 MG extended release tablet Take 650 mg by mouth as needed   Yes Ar Automatic Reconciliation   amLODIPine (NORVASC) 5 MG tablet TAKE 1 TABLET EVERY DAY 11/23/22  Yes Ar Automatic Reconciliation   aspirin 81 MG EC tablet Take 81 mg by mouth daily 1/4/19  Yes Ar Automatic Reconciliation   diclofenac sodium (VOLTAREN) 1 % GEL Apply topically 4 times daily   Yes Ar Automatic Reconciliation   furosemide (LASIX) 20 MG tablet TAKE 1 TABLET EVERY DAY 8/4/22  Yes Ar Automatic Reconciliation   levothyroxine (SYNTHROID) 100 MCG tablet Take 100 mcg by mouth every morning (before breakfast)   Yes Ar Automatic Reconciliation   meloxicam (MOBIC) 15 MG tablet [facility]: Outside name: meloxicam (MOBIC) 15 mg tablet 1/18/22  Yes Ar Automatic Reconciliation   metoprolol succinate (TOPROL XL) 25 MG extended release tablet Take 25 mg by mouth daily 7/29/21  Yes Ar Automatic Reconciliation   polyethylene glycol (GLYCOLAX) 17 GM/SCOOP powder Miralax 17 gram/dose oral powder   take as directed for bowel prep   Yes Ar Automatic Reconciliation         /65 (Site: Left Upper Arm, Position: Sitting, Cuff Size: Medium Adult)   Pulse 53   Ht 5' 7\" (1.702 m)   Wt 279 lb (126.6 kg)   SpO2 100%   BMI 43.70 kg/m²   Physical Exam  Constitutional:       Appearance: She is well-developed. HENT:      Head: Normocephalic and atraumatic. Eyes:      Conjunctiva/sclera: Conjunctivae normal.   Neck:      Thyroid: No thyromegaly. Vascular: No JVD. Trachea: No tracheal deviation. Cardiovascular:      Rate and Rhythm: Normal rate and regular rhythm. Chest Wall: PMI is not displaced. Pulses: No decreased pulses. Heart sounds: No murmur heard. No gallop. No S3 sounds. Pulmonary:      Effort: No respiratory distress. Breath sounds: No wheezing or rales. Chest:      Chest wall: No tenderness. Abdominal:      Palpations: Abdomen is soft. Tenderness: There is no abdominal tenderness. Musculoskeletal:      Cervical back: Neck supple. Skin:     General: Skin is warm. Neurological:      Mental Status: She is alert and oriented to person, place, and time. Ms. Andrei Burt has a reminder for a \"due or due soon\" health maintenance. I have asked that she contact her primary care provider for follow-up on this health maintenance. No flowsheet data found. SUMMARY:echo-2018  Left ventricle: Systolic function was normal. Ejection fraction was  estimated in the range of 55 % to 60 %. There were no regional wall motion  abnormalities. There was mild concentric hypertrophy. Doppler parameters  were consistent with abnormal left ventricular relaxation (grade 1  diastolic dysfunction). Right ventricle: The size was at the upper limits of normal.    Mitral valve: There was mild annular calcification. Tricuspid valve: There was mild regurgitation. Pulmonic valve: There was mild regurgitation. NUCLEAR IMAGIN2018     Findings:   1. Stress images reveal normal Myoview distrubution in all the LV segments in short axis, vertical and horizontal long axis views. 2. Resting images have a normal uptake. 3. Gated images reveal normal wall motion and the ejection fraction is calculated to be 85%. Conclusion:   1. Normal perfusion scan. 2. Normal wall motion and ejection fraction. 3. No evidence of significant fixed or reversible defect suggesting ischemia or myocardial infarction noted from this nuclear study. 4. Low risk scan. Sharda Miranda MD Test Supervisor, ECG [place], SPECT [place] 2021   Procedure Conclusion    Nuclear Stress Test    Nuclear Cardiac Spect Rest then Gated Stress with tomographic imaging/tomography utilized for the tomographic myocardical perfusion imaging performed. Suyapa Morrow was used as the stressing method and agent. (Suyapa Morrow given via a 10 - 20 sec injection.)   One day myocardial perfusion study. Date: 2021.    Left ventricular perfusion is abnormal. Myocardial perfusion imaging supports a high risk stress test.   There is a prior study available for comparison. . As compared to the previous study, there are significant changes. Perfusion defects appear to be new. The left ventricular ejection fraction has decreased. Segmental wall-motion abnormalities are new. This Single Photon Emission Computer Tomograph (SPECT) study utilized tomographic imaging/tomography for the tomographic myocardial perfusion imaging performed during this study. Interpretation Summary    SPECT: Left ventricular function post-stress was normal. Calculated ejection fraction is 77%. The TID ratio is 0.93. Baseline ECG: Normal EKG. SPECT: Myocardial perfusion imaging defect 1: There is a defect that is large in size with a moderate reduction in uptake present in the basal-apical, anteroseptal and lateral location(s) that is reversible. There is normal wall motion in the defect area. Viability in the area is good. The defect appears to be ischemia. SPECT: Left ventricular perfusion is abnormal. Myocardial perfusion imaging supports a high risk stress test.        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 Procedure done via right radial artery without any complications. Echo   3/2023  Interpretation Summary    Left Ventricle: Normal left ventricular systolic function with a visually estimated EF of 55 - 60%. Left ventricle size is normal. Normal wall thickness. Normal wall motion. Normal diastolic function. Aortic Valve: Valve structure is normal. Mildly calcified noncoronary cusp. Mitral Valve: Trace regurgitation. Pulmonary Arteries: Pulmonary hypertension not present. The estimated PASP is 29 mmHg. Technical qualifiers: Color flow Doppler was performed and pulse wave and/or continuous wave Doppler was performed.     No flowsheet data found.      Assessment        Diagnosis Orders   1. Chronic diastolic (congestive) heart failure (HCC)      Normal LV function, Appears clinically compensated, continue current treatment      2. Nonrheumatic mitral (valve) insufficiency      Trace by echo, monitor      3. Essential (primary) hypertension      B/P controlled, continue current medications      4. Morbid (severe) obesity due to excess calories (HCC)      Weight loss efforts encouraged.      5. Hypothyroidism, unspecified type      Continue current treatment and f/u with PCP            Medications Discontinued During This Encounter   Medication Reason    liothyronine (CYTOMEL) 5 MCG tablet Therapy completed         7/2018  CHF compensated.  Blood pressure is controlled.  Not requiring metoprolol.  Will continue to monitor with salt restriction and diet  1/2019  Stable cardiac status.  Blood pressure is controlled on diet alone.  Continue aspirin.  Check lipids with PCP  12/2019  Noncardiac left-sided chest pain likely related to shoulder.  Blood pressure elevated.  Started on amlodipine 5 mg a day  6/2020  Stable cardiac status continue to monitor.  I have started HCTZ 25 mg a day.  This is mainly for ankle edema and mildly elevated blood pressure.  She will continue to monitor and use it as needed.  BMP in a few weeks  1/2021  Cardiac status stable.  Mildly elevated blood pressure.  Currently off HCTZ due to lightheadedness.  Continue monitoring.  Patient has continued to have progressive weight loss.  Continue with dieting  7/2021  Seen with increased blood pressure increase edema and chest pain.  Set up for testing add Lasix 20 mg a day.  Follow BMP  2021-07-29  Patient seen today for follow-up. Likely has atypical angina and progressive symptom. Large reversible defect of anterior anteroseptal anterolateral wall. Proceed with cardiac catheterization  Check labs. Add statin add beta-blocker  8/2021  Stable cardiac status.  Atypical angina stable.  No  significant CAD on recent cardiac cath continue treatment  Intolerant to statin. Myalgia with Lipitor. Improved after stopping that. Even though LDL is elevated we will start with dietary modification and hold off on another statin at present. 2/2022  Cardiac status stable continue current medical management monitor  8/2022  Stable cardiac status continue medical management monitor  2/2023  Cardiac status stable. CHF compensated continue medical management  Shortness of breath on exertion. Blood pressure elevated today usually runs normal will monitor. On echo check for LVH. Decide on medication adjustment  3/2023  Cardiac status is stable. Echocardiogram reviewed and discussed with patient, normal LV function normal diastolic function no hypertrophy. Blood pressure is controlled in office recommend to continue current medications, weight loss efforts encouraged.

## 2023-03-09 NOTE — PROGRESS NOTES
1. Have you been to the ER, urgent care clinic since your last visit? Hospitalized since your last visit?     no    2. Have you seen or consulted any other health care providers outside of the [address] [place] [address] since your last visit? Include any pap smears or colon screening.       No

## 2023-03-20 DIAGNOSIS — I10 ESSENTIAL (PRIMARY) HYPERTENSION: Primary | ICD-10-CM

## 2023-03-20 RX ORDER — METOPROLOL SUCCINATE 25 MG/1
25 TABLET, EXTENDED RELEASE ORAL DAILY
Qty: 90 TABLET | Refills: 3 | Status: SHIPPED | OUTPATIENT
Start: 2023-03-20

## 2023-05-01 ENCOUNTER — OFFICE VISIT (OUTPATIENT)
Age: 78
End: 2023-05-01
Payer: MEDICARE

## 2023-05-01 VITALS — HEIGHT: 67 IN | BODY MASS INDEX: 43.7 KG/M2

## 2023-05-01 DIAGNOSIS — M67.88 ACHILLES TENDINOSIS: Primary | ICD-10-CM

## 2023-05-01 PROCEDURE — G8400 PT W/DXA NO RESULTS DOC: HCPCS | Performed by: ORTHOPAEDIC SURGERY

## 2023-05-01 PROCEDURE — G8427 DOCREV CUR MEDS BY ELIG CLIN: HCPCS | Performed by: ORTHOPAEDIC SURGERY

## 2023-05-01 PROCEDURE — 1090F PRES/ABSN URINE INCON ASSESS: CPT | Performed by: ORTHOPAEDIC SURGERY

## 2023-05-01 PROCEDURE — 99213 OFFICE O/P EST LOW 20 MIN: CPT | Performed by: ORTHOPAEDIC SURGERY

## 2023-05-01 PROCEDURE — 1036F TOBACCO NON-USER: CPT | Performed by: ORTHOPAEDIC SURGERY

## 2023-05-01 PROCEDURE — G8417 CALC BMI ABV UP PARAM F/U: HCPCS | Performed by: ORTHOPAEDIC SURGERY

## 2023-05-01 PROCEDURE — 1123F ACP DISCUSS/DSCN MKR DOCD: CPT | Performed by: ORTHOPAEDIC SURGERY

## 2023-05-01 PROCEDURE — 73610 X-RAY EXAM OF ANKLE: CPT | Performed by: ORTHOPAEDIC SURGERY

## 2023-05-01 RX ORDER — FAMOTIDINE 20 MG/1
20 TABLET, FILM COATED ORAL DAILY
Qty: 30 TABLET | Refills: 0 | Status: SHIPPED | OUTPATIENT
Start: 2023-05-01

## 2023-05-01 RX ORDER — MELOXICAM 15 MG/1
15 TABLET ORAL DAILY
Qty: 30 TABLET | Refills: 3 | Status: SHIPPED | OUTPATIENT
Start: 2023-05-01

## 2023-05-01 NOTE — PROGRESS NOTES
AMBULATORY PROGRESS NOTE      Patient: Tommy Scanlon             MRN: [id number]     SSN: [id number] Body mass index is 43.7 kg/m². YOB: 1945     AGE: 68 y.o. EX: female    PCP: Jens Echavarria MD       IMPRESSION //  DIAGNOSIS AND TREATMENT PLAN      Ramila Melo has a diagnosis of:      She had longstanding pain, to her left hindfoot long Achilles tendon. I spoke about surgical intervention, but she lives alone. So she like to try and avoid surgery. She had formal physical therapy as soon as seeing 2 other foot ankle providers in the past Dr. Kwame Pruett and then the [facility] foot podiatry podiatric specialist in the remote past.    She is tried anti-inflammatories shoewear changes silicone inserts to protect her hindfoot, and still walks with a limp and having disabling pain to her left hindfoot. About the details of surgery once again but again she also follow-up with surgery. We will get an MRI of her left ankle to further define the extent of her tendinopathy. DIAGNOSES    1. Achilles tendinosis          PLAN:    1. MRI LEFT ANKLE IN ORDER TO ASSESS ACHILLES INSERTION POINT  2. [employer] Achillotran ankle sleeve/brace OTC:       RTO  LEFT ANKLE MRI    Orders Placed This Encounter    [19006] Ankle Min 3V    MRI ANKLE LEFT WO CONTRAST     Standing Status:   Future     Standing Expiration Date:   5/1/2024     Order Specific Question:   Reason for exam:     Answer: Insertional Achilles tendinopathy     Order Specific Question:   What is the sedation requirement? Answer:   None    meloxicam (MOBIC) 15 MG tablet     Sig: Take 1 tablet by mouth daily     Dispense:  30 tablet     Refill:  3    famotidine (PEPCID) 20 MG tablet     Sig: Take 1 tablet by mouth Daily     Dispense:  30 tablet     Refill:  0        Patient Instructions   [email or web address]  Achillio train  Pepcid         Please follow up with your PCP for any health maintenance as recommended.          Ramila

## 2023-05-26 ENCOUNTER — HOSPITAL ENCOUNTER (OUTPATIENT)
Facility: HOSPITAL | Age: 78
End: 2023-05-26
Payer: MEDICARE

## 2023-05-26 DIAGNOSIS — R06.02 SHORTNESS OF BREATH: ICD-10-CM

## 2023-05-26 DIAGNOSIS — M67.88 ACHILLES TENDINOSIS: ICD-10-CM

## 2023-05-26 PROCEDURE — 71046 X-RAY EXAM CHEST 2 VIEWS: CPT

## 2023-05-26 PROCEDURE — 73721 MRI JNT OF LWR EXTRE W/O DYE: CPT

## 2023-09-18 ENCOUNTER — OFFICE VISIT (OUTPATIENT)
Age: 78
End: 2023-09-18
Payer: MEDICARE

## 2023-09-18 VITALS
HEIGHT: 67 IN | BODY MASS INDEX: 45.04 KG/M2 | WEIGHT: 287 LBS | DIASTOLIC BLOOD PRESSURE: 67 MMHG | HEART RATE: 67 BPM | SYSTOLIC BLOOD PRESSURE: 140 MMHG | OXYGEN SATURATION: 98 %

## 2023-09-18 DIAGNOSIS — E03.9 HYPOTHYROIDISM, UNSPECIFIED TYPE: ICD-10-CM

## 2023-09-18 DIAGNOSIS — I50.32 CHRONIC DIASTOLIC (CONGESTIVE) HEART FAILURE (HCC): Primary | ICD-10-CM

## 2023-09-18 DIAGNOSIS — I34.0 NONRHEUMATIC MITRAL (VALVE) INSUFFICIENCY: ICD-10-CM

## 2023-09-18 DIAGNOSIS — E66.01 MORBID (SEVERE) OBESITY DUE TO EXCESS CALORIES (HCC): ICD-10-CM

## 2023-09-18 DIAGNOSIS — I10 ESSENTIAL (PRIMARY) HYPERTENSION: ICD-10-CM

## 2023-09-18 PROBLEM — I25.119 CORONARY ARTERY DISEASE INVOLVING NATIVE CORONARY ARTERY OF NATIVE HEART WITH ANGINA PECTORIS (HCC): Status: RESOLVED | Noted: 2021-08-03 | Resolved: 2023-09-18

## 2023-09-18 PROCEDURE — G8400 PT W/DXA NO RESULTS DOC: HCPCS | Performed by: INTERNAL MEDICINE

## 2023-09-18 PROCEDURE — G8427 DOCREV CUR MEDS BY ELIG CLIN: HCPCS | Performed by: INTERNAL MEDICINE

## 2023-09-18 PROCEDURE — 3077F SYST BP >= 140 MM HG: CPT | Performed by: INTERNAL MEDICINE

## 2023-09-18 PROCEDURE — G8417 CALC BMI ABV UP PARAM F/U: HCPCS | Performed by: INTERNAL MEDICINE

## 2023-09-18 PROCEDURE — 99214 OFFICE O/P EST MOD 30 MIN: CPT | Performed by: INTERNAL MEDICINE

## 2023-09-18 PROCEDURE — 1123F ACP DISCUSS/DSCN MKR DOCD: CPT | Performed by: INTERNAL MEDICINE

## 2023-09-18 PROCEDURE — 1036F TOBACCO NON-USER: CPT | Performed by: INTERNAL MEDICINE

## 2023-09-18 PROCEDURE — 1090F PRES/ABSN URINE INCON ASSESS: CPT | Performed by: INTERNAL MEDICINE

## 2023-09-18 PROCEDURE — 3078F DIAST BP <80 MM HG: CPT | Performed by: INTERNAL MEDICINE

## 2023-09-18 ASSESSMENT — PATIENT HEALTH QUESTIONNAIRE - PHQ9
2. FEELING DOWN, DEPRESSED OR HOPELESS: 0
SUM OF ALL RESPONSES TO PHQ QUESTIONS 1-9: 0
1. LITTLE INTEREST OR PLEASURE IN DOING THINGS: 0
SUM OF ALL RESPONSES TO PHQ QUESTIONS 1-9: 0
SUM OF ALL RESPONSES TO PHQ9 QUESTIONS 1 & 2: 0
DEPRESSION UNABLE TO ASSESS: FUNCTIONAL CAPACITY MOTIVATION LIMITS ACCURACY

## 2023-09-18 NOTE — PROGRESS NOTES
1. Have you been to the ER, urgent care clinic since your last visit? Hospitalized since your last visit? No    2. Have you seen or consulted any other health care providers outside of the [address] [place] [address] since your last visit? Include any pap smears or colon screening.       No
cardiac status. Atypical angina stable. No significant CAD on recent cardiac cath continue treatment  Intolerant to statin. Myalgia with Lipitor. Improved after stopping that. Even though LDL is elevated we will start with dietary modification and hold off on another statin at present. 2/2022  Cardiac status stable continue current medical management monitor  8/2022  Stable cardiac status continue medical management monitor  2/2023  Cardiac status stable. CHF compensated continue medical management  Shortness of breath on exertion. Blood pressure elevated today usually runs normal will monitor. On echo check for LVH. Decide on medication adjustment  3/2023  Cardiac status is stable. Echocardiogram reviewed and discussed with patient, normal LV function normal diastolic function no hypertrophy. Blood pressure is controlled in office recommend to continue current medications, weight loss efforts encouraged. 9/2023  Stable cardiac status continue medical management. Normal ejection fraction in past.  Blood pressure stable.   Continue dietary modification

## 2023-10-06 RX ORDER — FUROSEMIDE 20 MG/1
TABLET ORAL
Qty: 90 TABLET | Refills: 10 | Status: SHIPPED | OUTPATIENT
Start: 2023-10-06

## 2023-11-08 ENCOUNTER — OFFICE VISIT (OUTPATIENT)
Age: 78
End: 2023-11-08

## 2023-11-08 VITALS — HEIGHT: 67 IN | WEIGHT: 287 LBS | BODY MASS INDEX: 45.04 KG/M2

## 2023-11-08 DIAGNOSIS — G89.29 CHRONIC LEFT SHOULDER PAIN: Primary | ICD-10-CM

## 2023-11-08 DIAGNOSIS — M25.512 CHRONIC LEFT SHOULDER PAIN: Primary | ICD-10-CM

## 2023-11-08 DIAGNOSIS — M75.102 ROTATOR CUFF SYNDROME, LEFT: ICD-10-CM

## 2023-11-08 RX ORDER — TRIAMCINOLONE ACETONIDE 40 MG/ML
40 INJECTION, SUSPENSION INTRA-ARTICULAR; INTRAMUSCULAR ONCE
Status: COMPLETED | OUTPATIENT
Start: 2023-11-08 | End: 2023-11-08

## 2023-11-08 RX ADMIN — TRIAMCINOLONE ACETONIDE 40 MG: 40 INJECTION, SUSPENSION INTRA-ARTICULAR; INTRAMUSCULAR at 09:20

## 2023-12-08 RX ORDER — AMLODIPINE BESYLATE 5 MG/1
TABLET ORAL
Qty: 90 TABLET | Refills: 3 | Status: SHIPPED | OUTPATIENT
Start: 2023-12-08

## 2024-02-26 ENCOUNTER — OFFICE VISIT (OUTPATIENT)
Age: 79
End: 2024-02-26
Payer: MEDICARE

## 2024-02-26 VITALS — BODY MASS INDEX: 45.99 KG/M2 | HEIGHT: 67 IN | WEIGHT: 293 LBS

## 2024-02-26 DIAGNOSIS — M25.562 ACUTE PAIN OF BOTH KNEES: Primary | ICD-10-CM

## 2024-02-26 DIAGNOSIS — M25.561 ACUTE PAIN OF BOTH KNEES: Primary | ICD-10-CM

## 2024-02-26 DIAGNOSIS — M17.0 PRIMARY OSTEOARTHRITIS OF BOTH KNEES: ICD-10-CM

## 2024-02-26 PROCEDURE — 73560 X-RAY EXAM OF KNEE 1 OR 2: CPT | Performed by: ORTHOPAEDIC SURGERY

## 2024-02-26 PROCEDURE — 20611 DRAIN/INJ JOINT/BURSA W/US: CPT | Performed by: ORTHOPAEDIC SURGERY

## 2024-02-26 PROCEDURE — 1123F ACP DISCUSS/DSCN MKR DOCD: CPT | Performed by: ORTHOPAEDIC SURGERY

## 2024-02-26 PROCEDURE — 99214 OFFICE O/P EST MOD 30 MIN: CPT | Performed by: ORTHOPAEDIC SURGERY

## 2024-02-26 RX ORDER — LIDOCAINE HYDROCHLORIDE 10 MG/ML
4 INJECTION, SOLUTION INFILTRATION; PERINEURAL ONCE
Status: COMPLETED | OUTPATIENT
Start: 2024-02-26 | End: 2024-02-26

## 2024-02-26 RX ORDER — TRIAMCINOLONE ACETONIDE 40 MG/ML
40 INJECTION, SUSPENSION INTRA-ARTICULAR; INTRAMUSCULAR ONCE
Status: COMPLETED | OUTPATIENT
Start: 2024-02-26 | End: 2024-02-26

## 2024-02-26 RX ADMIN — LIDOCAINE HYDROCHLORIDE 4 ML: 10 INJECTION, SOLUTION INFILTRATION; PERINEURAL at 10:53

## 2024-02-26 RX ADMIN — TRIAMCINOLONE ACETONIDE 40 MG: 40 INJECTION, SUSPENSION INTRA-ARTICULAR; INTRAMUSCULAR at 10:54

## 2024-02-26 RX ADMIN — TRIAMCINOLONE ACETONIDE 40 MG: 40 INJECTION, SUSPENSION INTRA-ARTICULAR; INTRAMUSCULAR at 10:53

## 2024-02-26 NOTE — PROGRESS NOTES
Ramila Jama  1945   Chief Complaint   Patient presents with    Knee Pain     bilateral        HISTORY OF PRESENT ILLNESS  Ramila Jama is a 78 y.o. female who presents today for evaluation of bilateral knee pain.  Pain is a 8/10. Pain has been present for longstanding.  Patient has a significant amount of problems over the years has had shots in the past and Euflexxa injections with some relief.  Has not had any since 2022 lately her quality of life has deteriorated that she is having great difficulty walking.  Has tried following treatments: Injections:Yes; Brace:No; Therapy:No; Cane/Crutch:No      Allergies   Allergen Reactions    Ciprofloxacin Hives     Rash, nausea    Ciprofloxacin-Dexamethasone Nausea Only    Statins Myalgia        Past Medical History:   Diagnosis Date    Bilateral knee pain     Chronic diastolic CHF (congestive heart failure) (HCC)     Chronic diastolic heart failure (HCC)     Dysuria     GERD (gastroesophageal reflux disease)     HTN (hypertension)     Hx of colonic polyp     Hypothyroidism     Morbid obesity (HCC) 7/9/2013    Proteinuria     Transfusion history     No history of receiving blood or blood product transfusion(s).    Unspecified hypothyroidism     Urinary incontinence     UTI (urinary tract infection)       Social History       Tobacco History       Smoking Status  Never      Smokeless Tobacco Use  Never              Alcohol History       Alcohol Use Status  No              Drug Use       Drug Use Status  No              Sexual Activity       Sexually Active  Not Asked                   Past Surgical History:   Procedure Laterality Date    BREAST BIOPSY      BUNIONECTOMY      left big toe    COLONOSCOPY  8/26/11    DILATION AND CURETTAGE OF UTERUS  2011      Family History   Problem Relation Age of Onset    Hypertension Brother     Heart Attack Father     Hypertension Father     Stroke Mother     Other Mother         congestive heart failure    Hypertension

## 2024-03-01 DIAGNOSIS — I10 ESSENTIAL (PRIMARY) HYPERTENSION: ICD-10-CM

## 2024-03-01 DIAGNOSIS — I50.32 CHRONIC DIASTOLIC (CONGESTIVE) HEART FAILURE (HCC): Primary | ICD-10-CM

## 2024-03-01 RX ORDER — FUROSEMIDE 20 MG/1
20 TABLET ORAL DAILY
Qty: 90 TABLET | Refills: 1 | Status: SHIPPED | OUTPATIENT
Start: 2024-03-01

## 2024-03-01 RX ORDER — AMLODIPINE BESYLATE 5 MG/1
5 TABLET ORAL DAILY
Qty: 90 TABLET | Refills: 1 | Status: SHIPPED | OUTPATIENT
Start: 2024-03-01

## 2024-03-01 RX ORDER — METOPROLOL SUCCINATE 25 MG/1
25 TABLET, EXTENDED RELEASE ORAL DAILY
Qty: 90 TABLET | Refills: 1 | Status: SHIPPED | OUTPATIENT
Start: 2024-03-01

## 2024-03-01 NOTE — TELEPHONE ENCOUNTER
Requested Prescriptions     Pending Prescriptions Disp Refills    metoprolol succinate (TOPROL XL) 25 MG extended release tablet 90 tablet 1     Sig: Take 1 tablet by mouth daily    furosemide (LASIX) 20 MG tablet 90 tablet 1     Sig: Take 1 tablet by mouth daily    amLODIPine (NORVASC) 5 MG tablet 90 tablet 1     Sig: Take 1 tablet by mouth daily

## 2024-04-11 ENCOUNTER — OFFICE VISIT (OUTPATIENT)
Age: 79
End: 2024-04-11
Payer: MEDICARE

## 2024-04-11 VITALS
SYSTOLIC BLOOD PRESSURE: 157 MMHG | WEIGHT: 293 LBS | BODY MASS INDEX: 45.99 KG/M2 | HEIGHT: 67 IN | OXYGEN SATURATION: 98 % | HEART RATE: 59 BPM | DIASTOLIC BLOOD PRESSURE: 64 MMHG

## 2024-04-11 DIAGNOSIS — E66.01 MORBID (SEVERE) OBESITY DUE TO EXCESS CALORIES (HCC): ICD-10-CM

## 2024-04-11 DIAGNOSIS — I10 ESSENTIAL (PRIMARY) HYPERTENSION: ICD-10-CM

## 2024-04-11 DIAGNOSIS — I50.32 CHRONIC DIASTOLIC HEART FAILURE (HCC): Primary | ICD-10-CM

## 2024-04-11 PROCEDURE — 3078F DIAST BP <80 MM HG: CPT | Performed by: INTERNAL MEDICINE

## 2024-04-11 PROCEDURE — 93000 ELECTROCARDIOGRAM COMPLETE: CPT | Performed by: INTERNAL MEDICINE

## 2024-04-11 PROCEDURE — 3077F SYST BP >= 140 MM HG: CPT | Performed by: INTERNAL MEDICINE

## 2024-04-11 PROCEDURE — 1123F ACP DISCUSS/DSCN MKR DOCD: CPT | Performed by: INTERNAL MEDICINE

## 2024-04-11 PROCEDURE — 99214 OFFICE O/P EST MOD 30 MIN: CPT | Performed by: INTERNAL MEDICINE

## 2024-04-11 RX ORDER — SPIRONOLACTONE 25 MG/1
25 TABLET ORAL DAILY
Qty: 30 TABLET | Refills: 5 | Status: SHIPPED | OUTPATIENT
Start: 2024-04-11

## 2024-04-11 ASSESSMENT — PATIENT HEALTH QUESTIONNAIRE - PHQ9
SUM OF ALL RESPONSES TO PHQ QUESTIONS 1-9: 0
SUM OF ALL RESPONSES TO PHQ QUESTIONS 1-9: 0
SUM OF ALL RESPONSES TO PHQ9 QUESTIONS 1 & 2: 0
1. LITTLE INTEREST OR PLEASURE IN DOING THINGS: NOT AT ALL
SUM OF ALL RESPONSES TO PHQ QUESTIONS 1-9: 0
2. FEELING DOWN, DEPRESSED OR HOPELESS: NOT AT ALL
SUM OF ALL RESPONSES TO PHQ QUESTIONS 1-9: 0

## 2024-04-11 ASSESSMENT — ENCOUNTER SYMPTOMS
EYES NEGATIVE: 1
SHORTNESS OF BREATH: 1
GASTROINTESTINAL NEGATIVE: 1

## 2024-04-11 NOTE — PROGRESS NOTES
Patient did not bring a list or medications  
controlled on diet alone.  Continue aspirin.  Check lipids with PCP  12/2019  Noncardiac left-sided chest pain likely related to shoulder.  Blood pressure elevated.  Started on amlodipine 5 mg a day  6/2020  Stable cardiac status continue to monitor.  I have started HCTZ 25 mg a day.  This is mainly for ankle edema and mildly elevated blood pressure.  She will continue to monitor and use it as needed.  BMP in a few weeks  1/2021  Cardiac status stable.  Mildly elevated blood pressure.  Currently off HCTZ due to lightheadedness.  Continue monitoring.  Patient has continued to have progressive weight loss.  Continue with dieting  7/2021  Seen with increased blood pressure increase edema and chest pain.  Set up for testing add Lasix 20 mg a day.  Follow BMP  2021-07-29  Patient seen today for follow-up. Likely has atypical angina and progressive symptom. Large reversible defect of anterior anteroseptal anterolateral wall. Proceed with cardiac catheterization  Check labs. Add statin add beta-blocker  8/2021  Stable cardiac status.  Atypical angina stable.  No significant CAD on recent cardiac cath continue treatment  Intolerant to statin.  Myalgia with Lipitor.  Improved after stopping that.  Even though LDL is elevated we will start with dietary modification and hold off on another statin at present.  2/2022  Cardiac status stable continue current medical management monitor  8/2022  Stable cardiac status continue medical management monitor  2/2023  Cardiac status stable.  CHF compensated continue medical management  Shortness of breath on exertion.  Blood pressure elevated today usually runs normal will monitor.  On echo check for LVH.  Decide on medication adjustment  3/2023  Cardiac status is stable.  Echocardiogram reviewed and discussed with patient, normal LV function normal diastolic function no hypertrophy.  Blood pressure is controlled in office recommend to continue current medications, weight loss efforts

## 2024-04-18 DIAGNOSIS — I50.32 CHRONIC DIASTOLIC HEART FAILURE (HCC): ICD-10-CM

## 2024-04-18 LAB
ANION GAP SERPL CALCULATED.3IONS-SCNC: 10 MMOL/L (ref 3–15)
BUN BLDV-MCNC: 16 MG/DL (ref 6–22)
CALCIUM SERPL-MCNC: 9.6 MG/DL (ref 8.4–10.5)
CHLORIDE BLD-SCNC: 103 MMOL/L (ref 98–110)
CO2: 27 MMOL/L (ref 20–32)
CREAT SERPL-MCNC: 0.9 MG/DL (ref 0.8–1.4)
GLOMERULAR FILTRATION RATE: >60 ML/MIN/1.73 SQ.M.
GLUCOSE: 100 MG/DL (ref 70–99)
POTASSIUM SERPL-SCNC: 4.2 MMOL/L (ref 3.5–5.5)
SODIUM BLD-SCNC: 140 MMOL/L (ref 133–145)

## 2024-04-19 ENCOUNTER — TELEPHONE (OUTPATIENT)
Age: 79
End: 2024-04-19

## 2024-04-19 RX ORDER — METOPROLOL SUCCINATE 25 MG/1
25 TABLET, EXTENDED RELEASE ORAL DAILY
COMMUNITY

## 2024-04-19 NOTE — TELEPHONE ENCOUNTER
Paper message per Bautista Lim NP  regarding BP log. Resume Toprol Xl 25 mg a day. If any questions to call office.

## 2024-05-09 DIAGNOSIS — I50.32 CHRONIC DIASTOLIC HEART FAILURE (HCC): ICD-10-CM

## 2024-05-09 LAB
ANION GAP SERPL CALCULATED.3IONS-SCNC: 7 MMOL/L (ref 3–15)
BUN BLDV-MCNC: 17 MG/DL (ref 6–22)
CALCIUM SERPL-MCNC: 9.7 MG/DL (ref 8.4–10.5)
CHLORIDE BLD-SCNC: 103 MMOL/L (ref 98–110)
CO2: 30 MMOL/L (ref 20–32)
CREAT SERPL-MCNC: 0.9 MG/DL (ref 0.8–1.4)
GFR, ESTIMATED: >60 ML/MIN/1.73 SQ.M.
GLUCOSE: 92 MG/DL (ref 70–99)
POTASSIUM SERPL-SCNC: 4.8 MMOL/L (ref 3.5–5.5)
SODIUM BLD-SCNC: 140 MMOL/L (ref 133–145)

## 2024-08-13 RX ORDER — LIOTHYRONINE SODIUM 5 UG/1
TABLET ORAL
COMMUNITY

## 2024-08-13 NOTE — PROGRESS NOTES
Instructions for your procedure at [facility]      Today's Date: 8/13/2024      Patient's Name: Ramila Jama      Procedure Date: 8/15        Please enter the main entrance of the hospital and check-in at the  located in the lobby.      Do NOT eat or drink anything, including candy, gum, or ice chips after midnight prior to your procedure, unless it is part of your prep.  Brush your teeth before coming to the hospital.You may swish with water, but do not swallow.  No smoking/Vaping/E-Cigarettes 24 hours prior to the day of procedure.  No alcohol 24 hours prior to the day of procedure.  No recreational drugs for one week prior to the day of procedure.  Bring Photo ID, Insurance information, and Co-pay if required on day of procedure.  Bring in pertinent legal documents, such as, Medical Power of , DNR, Advance Directive, etc.  Leave all other valuables, including money/purse, at home.  Remove jewelry, including ALL body piercings, nail polish, acrylic nails, and makeup (including mascara); no lotions, powders, deodorant, and/or perfume/cologne/after shave on the skin.  Glasses and dentures may be worn to the hospital.  They must be removed prior to procedure. Please bring case/container for glasses or dentures.  11. Contacts should not be worn on day of procedure.   12. Call the office ([phone]) if you have symptoms of a cold or illness within 24-48 hours prior to your procedure.   13. AN ADULT (relative or friend 18 years or older) MUST DRIVE YOU HOME AFTER YOUR PROCEDURE.   14. Please make arrangements for a responsible adult (18 years or older) to be with you for 24 hours after your procedure.   15. TWO VISITORS will be allowed in the waiting area during your procedure.       Special Instructions:      Bring list of CURRENT medications.  Follow instructions from the office regarding Bowel Prep, Vitamins, Iron, Blood Thinners, Insulin, Seizure, and Blood

## 2024-08-14 ENCOUNTER — ANESTHESIA EVENT (OUTPATIENT)
Facility: HOSPITAL | Age: 79
End: 2024-08-14
Payer: MEDICARE

## 2024-08-15 ENCOUNTER — HOSPITAL ENCOUNTER (OUTPATIENT)
Facility: HOSPITAL | Age: 79
Setting detail: OUTPATIENT SURGERY
Discharge: HOME | End: 2024-08-15
Attending: INTERNAL MEDICINE | Admitting: INTERNAL MEDICINE
Payer: MEDICARE

## 2024-08-15 ENCOUNTER — ANESTHESIA (OUTPATIENT)
Facility: HOSPITAL | Age: 79
End: 2024-08-15
Payer: MEDICARE

## 2024-08-15 VITALS
HEIGHT: 67 IN | SYSTOLIC BLOOD PRESSURE: 138 MMHG | BODY MASS INDEX: 45.52 KG/M2 | WEIGHT: 290 LBS | DIASTOLIC BLOOD PRESSURE: 73 MMHG | TEMPERATURE: 97.1 F | OXYGEN SATURATION: 99 % | HEART RATE: 62 BPM | RESPIRATION RATE: 11 BRPM

## 2024-08-15 PROCEDURE — 3700000000 HC ANESTHESIA ATTENDED CARE: Performed by: INTERNAL MEDICINE

## 2024-08-15 PROCEDURE — 6360000002 HC RX W HCPCS: Performed by: ANESTHESIOLOGY

## 2024-08-15 PROCEDURE — 7100000000 HC PACU RECOVERY - FIRST 15 MIN: Performed by: INTERNAL MEDICINE

## 2024-08-15 PROCEDURE — 3600007512: Performed by: INTERNAL MEDICINE

## 2024-08-15 PROCEDURE — 3600007502: Performed by: INTERNAL MEDICINE

## 2024-08-15 PROCEDURE — 88305 TISSUE EXAM BY PATHOLOGIST: CPT

## 2024-08-15 PROCEDURE — 7100000010 HC PHASE II RECOVERY - FIRST 15 MIN: Performed by: INTERNAL MEDICINE

## 2024-08-15 PROCEDURE — 3700000001 HC ADD 15 MINUTES (ANESTHESIA): Performed by: INTERNAL MEDICINE

## 2024-08-15 PROCEDURE — 2580000003 HC RX 258: Performed by: NURSE ANESTHETIST, CERTIFIED REGISTERED

## 2024-08-15 PROCEDURE — 2709999900 HC NON-CHARGEABLE SUPPLY: Performed by: INTERNAL MEDICINE

## 2024-08-15 RX ORDER — SODIUM CHLORIDE, SODIUM LACTATE, POTASSIUM CHLORIDE, CALCIUM CHLORIDE 600; 310; 30; 20 MG/100ML; MG/100ML; MG/100ML; MG/100ML
INJECTION, SOLUTION INTRAVENOUS CONTINUOUS
Status: DISCONTINUED | OUTPATIENT
Start: 2024-08-15 | End: 2024-08-15 | Stop reason: HOSPADM

## 2024-08-15 RX ORDER — LIDOCAINE HYDROCHLORIDE 10 MG/ML
1 INJECTION, SOLUTION EPIDURAL; INFILTRATION; INTRACAUDAL; PERINEURAL
Status: DISCONTINUED | OUTPATIENT
Start: 2024-08-15 | End: 2024-08-15 | Stop reason: HOSPADM

## 2024-08-15 RX ORDER — PROPOFOL 10 MG/ML
INJECTION, EMULSION INTRAVENOUS PRN
Status: DISCONTINUED | OUTPATIENT
Start: 2024-08-15 | End: 2024-08-15 | Stop reason: SDUPTHER

## 2024-08-15 RX ADMIN — PROPOFOL 80 MG: 10 INJECTION, EMULSION INTRAVENOUS at 12:55

## 2024-08-15 RX ADMIN — PROPOFOL 60 MG: 10 INJECTION, EMULSION INTRAVENOUS at 12:40

## 2024-08-15 RX ADMIN — SODIUM CHLORIDE, POTASSIUM CHLORIDE, SODIUM LACTATE AND CALCIUM CHLORIDE: 600; 310; 30; 20 INJECTION, SOLUTION INTRAVENOUS at 11:21

## 2024-08-15 RX ADMIN — PROPOFOL 30 MG: 10 INJECTION, EMULSION INTRAVENOUS at 12:43

## 2024-08-15 ASSESSMENT — PAIN - FUNCTIONAL ASSESSMENT
PAIN_FUNCTIONAL_ASSESSMENT: 0-10
PAIN_FUNCTIONAL_ASSESSMENT: NONE - DENIES PAIN

## 2024-08-15 NOTE — ANESTHESIA POSTPROCEDURE EVALUATION
Department of Anesthesiology  Postprocedure Note    Patient: Ramila Jama  MRN: [id number]  YOB: 1945  Date of evaluation: 8/15/2024    Procedure Summary       Date: 08/15/24 Room / Location: [facility] ENDO 02 / [facility] ENDOSCOPY    Anesthesia Start: 1235 Anesthesia Stop: 1305    Procedure: COLONOSCOPY DIAGNOSTIC with polypectomy (Abdomen) Diagnosis:       Positive colorectal cancer screening using Cologuard test      (Positive colorectal cancer screening using Cologuard test [R19.5])    Surgeons: Jay Ellis MD Responsible Provider: Lionel Malhotra MD    Anesthesia Type: MAC ASA Status: 3            Anesthesia Type: MAC    Dimitri Phase I: Dimitri Score: 10    Dimitri Phase II: Dimitri Score: 10    Anesthesia Post Evaluation    Patient location during evaluation: bedside  Patient participation: complete - patient participated  Level of consciousness: responsive to verbal stimuli  Airway patency: patent  Nausea & Vomiting: no nausea  Respiratory status: acceptable  Hydration status: euvolemic    No notable events documented.

## 2024-08-15 NOTE — H&P
[email or web address]  [phone]    GASTROENTEROLOGY CONSULT      Impression:   1. Cologuard pos       Plan:     1. [facility] all risks benefits alt discussed       Chief Complaint: colo guard pos      HPI:  Ramila Jama is a 79 y.o. female who I am being asked to see in consultation for an opinion regarding colo guard pos.    PMH:   Past Medical History:   Diagnosis Date    Bilateral knee pain     Chronic diastolic CHF (congestive heart failure) (HCC)     Chronic diastolic heart failure (HCC)     Dysuria     GERD (gastroesophageal reflux disease)     HTN (hypertension)     Hx of colonic polyp     Hypothyroidism     Morbid obesity (HCC) 7/9/2013    Proteinuria     Transfusion history     No history of receiving blood or blood product transfusion(s).    Unspecified hypothyroidism     Urinary incontinence     UTI (urinary tract infection)        PSH:   Past Surgical History:   Procedure Laterality Date    BREAST BIOPSY      BUNIONECTOMY      left big toe    COLONOSCOPY  8/26/11    DILATION AND CURETTAGE OF UTERUS  2011       Social HX:   Social History     Socioeconomic History    Marital status:      Spouse name: Not on file    Number of children: Not on file    Years of education: Not on file    Highest education level: Not on file   Occupational History    Not on file   Tobacco Use    Smoking status: Never     Passive exposure: Never    Smokeless tobacco: Never   Vaping Use    Vaping status: Never Used   Substance and Sexual Activity    Alcohol use: No    Drug use: No    Sexual activity: Not on file   Other Topics Concern    Not on file   Social History Narrative    Not on file     Social Determinants of Health     Financial Resource Strain: Not on file   Food Insecurity: Not on file   Transportation Needs: Not on file   Physical Activity: Not on file   Stress: Not on file   Social Connections: Not on file   Intimate Partner Violence: Not on file   Housing Stability: Not on file       FHX:  following from [facility]: Outside name: meloxicam (MOBIC) 15 mg tablet  Patient not taking: Reported on 8/13/2024 1/18/22   Automatic Reconciliation, Ar   polyethylene glycol (GLYCOLAX) 17 GM/SCOOP powder Miralax 17 gram/dose oral powder   take as directed for bowel prep    Automatic Reconciliation, Ar       Review of Systems:     Constitutional: No fevers, chills, weight loss, fatigue.   Skin: No rashes, pruritis, jaundice, ulcerations, erythema.   HENT: No headaches, nosebleeds, sinus pressure, rhinorrhea, sore throat.   Eyes: No visual changes, blurred vision, eye pain, photophobia, jaundice.   Cardiovascular: No chest pain, heart palpitations.   Respiratory: No cough, SOB, wheezing, chest discomfort, orthopnea.   Gastrointestinal: Neg unless noted otherwise in H&P   Genitourinary: No dysuria, bleeding, discharge, pyuria.   Musculoskeletal: No weakness, arthralgias, wasting.   Endo: No sweats.   Heme: No bruising, easy bleeding.   Allergies: As noted.   Neurological: Cranial nerves intact.  Alert and oriented. Gait not assessed.   Psychiatric:  No anxiety, depression, hallucinations.          BP (!) 155/72   Pulse 72   Temp 97.6 °F (36.4 °C) (Oral)   Resp 16   Ht 1.702 m (5' 7\")   Wt 131.5 kg (290 lb)   SpO2 99%   BMI 45.42 kg/m²     Physical Assessment:     constitutional: appearance: well developed, well nourished, normal habitus, no deformities, in no acute distress.   skin: inspection: no rashes, ulcers, icterus or other lesions; no clubbing or telangiectasias. palpation: no induration or subcutaneos nodules.   eyes: inspection: normal conjunctivae and lids; no jaundice pupils: normal  ENMT: mouth: normal oral mucosa,lips and gums; good dentition. oropharynx: normal tongue, hard and soft palate; posterior pharynx without erithema, exudate or lesions.   neck: thyroid: normal size, consistency and position; no masses or tenderness.   respiratory: effort: normal chest excursion; no

## 2024-08-15 NOTE — ANESTHESIA PRE PROCEDURE
Screen (If Applicable):  No results found for: \"LABABO\"    Drug/Infectious Status (If Applicable):  No results found for: \"HIV\", \"HEPCAB\"    COVID-19 Screening (If Applicable): No results found for: \"COVID19\"        Anesthesia Evaluation  Patient summary reviewed  Airway: Mallampati: III  TM distance: >3 FB   Neck ROM: limited  Mouth opening: > = 3 FB   Dental: normal exam         Pulmonary:Negative Pulmonary ROS breath sounds clear to auscultation                             Cardiovascular:  Exercise tolerance: good (>4 METS)  (+) hypertension: moderate, CHF: no interval change        Rhythm: regular  Rate: normal                    Neuro/Psych:   Negative Neuro/Psych ROS              GI/Hepatic/Renal:   (+) GERD: well controlled          Endo/Other:    (+) hypothyroidism: arthritis:..                 Abdominal:             Vascular: negative vascular ROS.         Other Findings:       Anesthesia Plan      MAC     ASA 3       Induction: intravenous.      Anesthetic plan and risks discussed with patient.                    MAKSIM FARRAR MD   8/15/2024

## 2024-09-12 ENCOUNTER — OFFICE VISIT (OUTPATIENT)
Age: 79
End: 2024-09-12
Payer: MEDICARE

## 2024-09-12 VITALS
DIASTOLIC BLOOD PRESSURE: 53 MMHG | OXYGEN SATURATION: 98 % | HEART RATE: 64 BPM | BODY MASS INDEX: 45.99 KG/M2 | WEIGHT: 293 LBS | HEIGHT: 67 IN | SYSTOLIC BLOOD PRESSURE: 132 MMHG

## 2024-09-12 DIAGNOSIS — I10 ESSENTIAL (PRIMARY) HYPERTENSION: ICD-10-CM

## 2024-09-12 DIAGNOSIS — E66.01 MORBID (SEVERE) OBESITY DUE TO EXCESS CALORIES (HCC): ICD-10-CM

## 2024-09-12 DIAGNOSIS — I34.0 NONRHEUMATIC MITRAL (VALVE) INSUFFICIENCY: ICD-10-CM

## 2024-09-12 DIAGNOSIS — I50.32 CHRONIC DIASTOLIC HEART FAILURE (HCC): Primary | ICD-10-CM

## 2024-09-12 PROCEDURE — 3078F DIAST BP <80 MM HG: CPT | Performed by: INTERNAL MEDICINE

## 2024-09-12 PROCEDURE — 1123F ACP DISCUSS/DSCN MKR DOCD: CPT | Performed by: INTERNAL MEDICINE

## 2024-09-12 PROCEDURE — 3075F SYST BP GE 130 - 139MM HG: CPT | Performed by: INTERNAL MEDICINE

## 2024-09-12 PROCEDURE — 99214 OFFICE O/P EST MOD 30 MIN: CPT | Performed by: INTERNAL MEDICINE

## 2024-09-12 RX ORDER — SPIRONOLACTONE 25 MG/1
25 TABLET ORAL DAILY
Qty: 90 TABLET | Refills: 3 | Status: SHIPPED | OUTPATIENT
Start: 2024-09-12

## 2024-09-12 ASSESSMENT — PATIENT HEALTH QUESTIONNAIRE - PHQ9
DEPRESSION UNABLE TO ASSESS: FUNCTIONAL CAPACITY MOTIVATION LIMITS ACCURACY
SUM OF ALL RESPONSES TO PHQ QUESTIONS 1-9: 0
SUM OF ALL RESPONSES TO PHQ QUESTIONS 1-9: 0
SUM OF ALL RESPONSES TO PHQ9 QUESTIONS 1 & 2: 0
SUM OF ALL RESPONSES TO PHQ QUESTIONS 1-9: 0
1. LITTLE INTEREST OR PLEASURE IN DOING THINGS: NOT AT ALL
2. FEELING DOWN, DEPRESSED OR HOPELESS: NOT AT ALL
SUM OF ALL RESPONSES TO PHQ QUESTIONS 1-9: 0

## 2024-09-12 ASSESSMENT — ENCOUNTER SYMPTOMS
EYES NEGATIVE: 1
SHORTNESS OF BREATH: 1
GASTROINTESTINAL NEGATIVE: 1

## 2024-09-25 RX ORDER — METOPROLOL SUCCINATE 25 MG/1
25 TABLET, EXTENDED RELEASE ORAL DAILY
Qty: 90 TABLET | Refills: 3 | Status: SHIPPED | OUTPATIENT
Start: 2024-09-25

## 2024-10-02 DIAGNOSIS — I10 ESSENTIAL (PRIMARY) HYPERTENSION: ICD-10-CM

## 2024-10-03 RX ORDER — AMLODIPINE BESYLATE 5 MG/1
5 TABLET ORAL DAILY
Qty: 90 TABLET | Refills: 3 | Status: SHIPPED | OUTPATIENT
Start: 2024-10-03

## 2025-03-01 DIAGNOSIS — I50.32 CHRONIC DIASTOLIC HEART FAILURE (HCC): ICD-10-CM

## 2025-04-08 DIAGNOSIS — I10 ESSENTIAL (PRIMARY) HYPERTENSION: ICD-10-CM

## 2025-04-09 RX ORDER — FUROSEMIDE 20 MG/1
20 TABLET ORAL DAILY
Qty: 90 TABLET | Refills: 3 | Status: SHIPPED | OUTPATIENT
Start: 2025-04-09

## 2025-06-12 ENCOUNTER — OFFICE VISIT (OUTPATIENT)
Age: 80
End: 2025-06-12

## 2025-06-12 DIAGNOSIS — M25.562 LEFT KNEE PAIN, UNSPECIFIED CHRONICITY: ICD-10-CM

## 2025-06-12 DIAGNOSIS — M17.0 PRIMARY OSTEOARTHRITIS OF BOTH KNEES: Primary | ICD-10-CM

## 2025-06-12 RX ORDER — TRIAMCINOLONE ACETONIDE 40 MG/ML
40 INJECTION, SUSPENSION INTRA-ARTICULAR; INTRAMUSCULAR ONCE
Status: COMPLETED | OUTPATIENT
Start: 2025-06-12 | End: 2025-06-12

## 2025-06-12 RX ADMIN — TRIAMCINOLONE ACETONIDE 40 MG: 40 INJECTION, SUSPENSION INTRA-ARTICULAR; INTRAMUSCULAR at 13:17

## 2025-06-12 NOTE — PROGRESS NOTES
equipment indicated today  6. No referral indicated today   7. No medications indicated today:   8. No Narcotic indicated today for short term acute pain secondary to OA. Patient given pain medication for short term acute pain relief. Goal is to treat patient according to above plan and to ultimately have patient off all pain medications once appropriate. If chronic pain management is required beyond what is expected for current orthopedic problem, will refer patient to pain management.  was reviewed and will be reviewed with every medication refill request.     RTC 4 weeks    By signing my name below, INura SCRIBE, attest that this documentation has been prepared under the direction and in the presence of Neal Pal MD  Electronically signed: AILYN Carlos. 6/12/25 8:05 AM EDT     INeal MD, personally performed the services described in this documentation. I have authorized the scribe to complete the medical record entries input within this chart. I have reviewed the chart and agree that the record reflects my personal performance and is accurate and complete. [Electronically Signed: Neal Pal MD. 6/12/2025 8:05 AM EDT]

## 2025-08-19 DIAGNOSIS — I50.32 CHRONIC DIASTOLIC HEART FAILURE (HCC): ICD-10-CM

## 2025-08-20 RX ORDER — SPIRONOLACTONE 25 MG/1
25 TABLET ORAL DAILY
Qty: 90 TABLET | Refills: 3 | Status: SHIPPED | OUTPATIENT
Start: 2025-08-20

## 2025-08-25 LAB
ANION GAP SERPL CALCULATED.3IONS-SCNC: 13 MMOL/L (ref 3–15)
BUN BLDV-MCNC: 16 MG/DL (ref 6–22)
CALCIUM SERPL-MCNC: 10 MG/DL (ref 8.4–10.5)
CHLORIDE BLD-SCNC: 100 MMOL/L (ref 98–110)
CO2: 26 MMOL/L (ref 20–32)
CREAT SERPL-MCNC: 0.9 MG/DL (ref 0.8–1.4)
GFR, ESTIMATED: 60.8 ML/MIN/1.73 SQ.M.
GLUCOSE: 94 MG/DL (ref 70–99)
POTASSIUM SERPL-SCNC: 4.6 MMOL/L (ref 3.5–5.5)
SODIUM BLD-SCNC: 139 MMOL/L (ref 133–145)

## (undated) DEVICE — CANNULA ORIG TL CLR W FOAM CUSHIONS AND 14FT SUPL TB 3 CHN

## (undated) DEVICE — SNARE POLYP M W27MMXL240CM OVL STIFF DISP CAPTIVATOR

## (undated) DEVICE — GLIDESHEATH SLENDER STAINLESS STEEL KIT: Brand: GLIDESHEATH SLENDER

## (undated) DEVICE — LINER SUCT CANSTR 3000CC PLAS SFT PRE ASSEMB W/OUT TBNG W/

## (undated) DEVICE — MEDI-VAC NON-CONDUCTIVE SUCTION TUBING: Brand: CARDINAL HEALTH

## (undated) DEVICE — RADIFOCUS OPTITORQUE ANGIOGRAPHIC CATHETER: Brand: OPTITORQUE

## (undated) DEVICE — SYRINGE MED 25GA 3ML L5/8IN SUBQ PLAS W/ DETACH NDL SFTY

## (undated) DEVICE — FORCEPS BX L240CM JAW DIA2.8MM L CAP W/ NDL MIC MESH TOOTH

## (undated) DEVICE — SET FLD ADMIN 3 W STPCOCK FIX FEM L BOR 1IN

## (undated) DEVICE — SYRINGE 20ML LL S/C 50

## (undated) DEVICE — SOLUTION IRRIG 1000ML H2O STRL BLT

## (undated) DEVICE — GAUZE,SPONGE,4"X4",16PLY,STRL,LF,10/TRAY: Brand: MEDLINE

## (undated) DEVICE — ENDOSCOPY PUMP TUBING/ CAP SET: Brand: ERBE

## (undated) DEVICE — UNDERPAD INCONT W23XL36IN STD BLU POLYPR BK FLUF SFT

## (undated) DEVICE — PACK PROCEDURE SURG VASC CATH 161 MMC LF

## (undated) DEVICE — GOWN PLASTIC FILM THMBHKS UNIV BLUE: Brand: CARDINAL HEALTH

## (undated) DEVICE — BAND HEMOSTAT DRY D-STAT RAD --

## (undated) DEVICE — SYRINGE MED 10ML LUERLOCK TIP W/O SFTY DISP

## (undated) DEVICE — CATHETER SUCT TR FL TIP 14FR W/ O CTRL

## (undated) DEVICE — COVER US PRB W15XL120CM W/ GEL RUBBERBAND TAPE STRP FLD GEN

## (undated) DEVICE — SYRINGE MED 50ML LUERSLIP TIP

## (undated) DEVICE — PROCEDURE KIT FLUID MGMT 10 FR CUST MAINFOLD

## (undated) DEVICE — DRAPE,ANGIO,BRACH,STERILE,38X44: Brand: MEDLINE

## (undated) DEVICE — YANKAUER,SMOOTH HANDLE,HIGH CAPACITY: Brand: MEDLINE INDUSTRIES, INC.

## (undated) DEVICE — CANNULA NSL AD TBNG L14FT STD PVC O2 CRV CONN NONFLARED NSL

## (undated) DEVICE — PRESSURE MONITORING SET: Brand: TRUWAVE